# Patient Record
Sex: FEMALE | Race: BLACK OR AFRICAN AMERICAN | NOT HISPANIC OR LATINO | Employment: OTHER | URBAN - METROPOLITAN AREA
[De-identification: names, ages, dates, MRNs, and addresses within clinical notes are randomized per-mention and may not be internally consistent; named-entity substitution may affect disease eponyms.]

---

## 2020-02-10 ENCOUNTER — LAB VISIT (OUTPATIENT)
Dept: LAB | Facility: HOSPITAL | Age: 70
End: 2020-02-10
Payer: COMMERCIAL

## 2020-02-10 ENCOUNTER — OFFICE VISIT (OUTPATIENT)
Dept: FAMILY MEDICINE | Facility: CLINIC | Age: 70
End: 2020-02-10
Payer: COMMERCIAL

## 2020-02-10 VITALS
OXYGEN SATURATION: 97 % | TEMPERATURE: 98 F | WEIGHT: 223.13 LBS | BODY MASS INDEX: 38.09 KG/M2 | SYSTOLIC BLOOD PRESSURE: 130 MMHG | DIASTOLIC BLOOD PRESSURE: 80 MMHG | HEIGHT: 64 IN | HEART RATE: 100 BPM

## 2020-02-10 DIAGNOSIS — Z00.00 ENCOUNTER FOR WELLNESS EXAMINATION: Primary | ICD-10-CM

## 2020-02-10 DIAGNOSIS — N75.0 BARTHOLIN GLAND CYST: ICD-10-CM

## 2020-02-10 DIAGNOSIS — B00.9 HSV-2 (HERPES SIMPLEX VIRUS 2) INFECTION: ICD-10-CM

## 2020-02-10 DIAGNOSIS — Z12.11 SCREENING FOR COLON CANCER: ICD-10-CM

## 2020-02-10 DIAGNOSIS — Z23 NEED FOR VACCINATION AGAINST STREPTOCOCCUS PNEUMONIAE: ICD-10-CM

## 2020-02-10 DIAGNOSIS — Z00.00 ENCOUNTER FOR WELLNESS EXAMINATION: ICD-10-CM

## 2020-02-10 DIAGNOSIS — Z11.59 NEED FOR HEPATITIS C SCREENING TEST: ICD-10-CM

## 2020-02-10 DIAGNOSIS — F17.210 NICOTINE DEPENDENCE, CIGARETTES, UNCOMPLICATED: ICD-10-CM

## 2020-02-10 DIAGNOSIS — Z13.820 SCREENING FOR OSTEOPOROSIS: ICD-10-CM

## 2020-02-10 DIAGNOSIS — Z12.39 SCREENING FOR BREAST CANCER: ICD-10-CM

## 2020-02-10 DIAGNOSIS — Z78.0 POST-MENOPAUSAL: ICD-10-CM

## 2020-02-10 PROCEDURE — 90471 IMMUNIZATION ADMIN: CPT | Mod: S$GLB,,, | Performed by: FAMILY MEDICINE

## 2020-02-10 PROCEDURE — 99387 PR PREVENTIVE VISIT,NEW,65 & OVER: ICD-10-PCS | Mod: 25,S$GLB,, | Performed by: FAMILY MEDICINE

## 2020-02-10 PROCEDURE — 90471 PNEUMOCOCCAL CONJUGATE VACCINE 13-VALENT LESS THAN 5YO & GREATER THAN: ICD-10-PCS | Mod: S$GLB,,, | Performed by: FAMILY MEDICINE

## 2020-02-10 PROCEDURE — 99999 PR PBB SHADOW E&M-NEW PATIENT-LVL IV: CPT | Mod: PBBFAC,,, | Performed by: FAMILY MEDICINE

## 2020-02-10 PROCEDURE — 81001 URINALYSIS AUTO W/SCOPE: CPT

## 2020-02-10 PROCEDURE — 99387 INIT PM E/M NEW PAT 65+ YRS: CPT | Mod: 25,S$GLB,, | Performed by: FAMILY MEDICINE

## 2020-02-10 PROCEDURE — 90670 PCV13 VACCINE IM: CPT | Mod: S$GLB,,, | Performed by: FAMILY MEDICINE

## 2020-02-10 PROCEDURE — 90670 PNEUMOCOCCAL CONJUGATE VACCINE 13-VALENT LESS THAN 5YO & GREATER THAN: ICD-10-PCS | Mod: S$GLB,,, | Performed by: FAMILY MEDICINE

## 2020-02-10 PROCEDURE — 99999 PR PBB SHADOW E&M-NEW PATIENT-LVL IV: ICD-10-PCS | Mod: PBBFAC,,, | Performed by: FAMILY MEDICINE

## 2020-02-10 RX ORDER — VALACYCLOVIR HYDROCHLORIDE 1 G/1
1000 TABLET, FILM COATED ORAL DAILY
Qty: 5 TABLET | Refills: 2 | Status: SHIPPED | OUTPATIENT
Start: 2020-02-10 | End: 2020-04-22 | Stop reason: SDUPTHER

## 2020-02-10 NOTE — PROGRESS NOTES
Subjective:      Patient ID: Triny Mandel is a 69 y.o. female.    Chief Complaint: Establish Care    HPI    Patient here today to establish care   Only complaint today is some blood from her rectum - not often   Notes that she does feel constipated at times, burning at times, only sees blood when she wipes  No changes in stool, no blood in stool  Last bowel movement - this morning. No blood at the time. Last noticed blood yesterday.   Notes that last time this happened she was diagnosed with HSV 2 and given acyclovir and it helped   Has been tested for HIV and other STDs recently in 10/19 and was told that everything else was negative     Needs mammogram   Needs colonoscopy   Needs DXA scan   Would like lab work today   Current smoker     Past Medical History:   Diagnosis Date    Arthritis     Herpes simplex        Past Surgical History:   Procedure Laterality Date    HYSTERECTOMY      unclear - doesn't think it was due to cancer       Family History   Problem Relation Age of Onset    Cancer Mother         breast cancer    No Known Problems Sister     No Known Problems Brother     No Known Problems Daughter     No Known Problems Son     No Known Problems Brother     No Known Problems Son        Social History     Socioeconomic History    Marital status:      Spouse name: Not on file    Number of children: 4    Years of education: Not on file    Highest education level: Not on file   Occupational History    Occupation: work at [employer]    Social Needs    Financial resource strain: Not on file    Food insecurity:     Worry: Not on file     Inability: Not on file    Transportation needs:     Medical: Not on file     Non-medical: Not on file   Tobacco Use    Smoking status: Current Every Day Smoker     Packs/day: 1.00     Years: 45.00     Pack years: 45.00    Smokeless tobacco: Never Used   Substance and Sexual Activity    Alcohol use: Yes     Frequency: Never     Comment:  every hoilday     Drug use: Never    Sexual activity: Not Currently     Partners: Male   Lifestyle    Physical activity:     Days per week: Not on file     Minutes per session: Not on file    Stress: Not on file   Relationships    Social connections:     Talks on phone: Not on file     Gets together: Not on file     Attends [employer] service: Not on file     Active member of club or organization: Not on file     Attends meetings of clubs or organizations: Not on file     Relationship status: Not on file   Other Topics Concern    Not on file   Social History Narrative    Not on file       The patient has no Health Maintenance topics of status Not Due        Review of patient's allergies indicates:  No Known Allergies    Review of Systems   Constitutional: Negative for chills and fever.   HENT: Negative for ear pain.    Eyes: Negative for blurred vision and pain.   Respiratory: Negative for cough and shortness of breath.    Cardiovascular: Negative for chest pain and leg swelling.   Gastrointestinal: Positive for blood in stool. Negative for abdominal pain, constipation, diarrhea, nausea and vomiting.   Genitourinary: Negative for dysuria.   Musculoskeletal: Negative for myalgias.   Skin: Positive for rash.   Neurological: Negative for headaches.       Objective:     Vitals:    02/10/20 0944   BP: 130/80   Pulse: 100   Temp: 98.2 °F (36.8 °C)     Body mass index is 38.3 kg/m².    Physical Exam   Constitutional: She is oriented to person, place, and time. She appears well-developed and well-nourished. No distress.   HENT:   Head: Normocephalic and atraumatic.   Right Ear: External ear normal.   Left Ear: External ear normal.   Nose: Nose normal.   Mouth/Throat: Oropharynx is clear and moist.   Eyes: Pupils are equal, round, and reactive to light. Conjunctivae and EOM are normal.   Neck: No thyromegaly present.   Cardiovascular: Normal rate, regular rhythm, normal heart sounds and intact distal pulses.   No  murmur heard.  Pulmonary/Chest: Effort normal and breath sounds normal. No respiratory distress. She has no wheezes. She has no rales. She exhibits no tenderness. Right breast exhibits no inverted nipple, no mass, no nipple discharge, no skin change and no tenderness. Left breast exhibits no inverted nipple, no mass, no nipple discharge, no skin change and no tenderness.   Abdominal: Soft. Bowel sounds are normal. She exhibits no distension. There is no tenderness.   Genitourinary: Rectal exam shows guaiac negative stool.       No vaginal discharge found.   Genitourinary Comments: Rash around rectum, small ulcers/blister like lesions that are tender  bartholian gland cyst. Not tender. No drainage.    Musculoskeletal: She exhibits no edema.   Lymphadenopathy:     She has no cervical adenopathy.   Neurological: She is alert and oriented to person, place, and time.   Skin: Skin is warm and dry.   Psychiatric: She has a normal mood and affect.   Nursing note and vitals reviewed.      Assessment and Plan:     Encounter for wellness examination  -     CBC auto differential; Future; Expected date: 02/10/2020  -     Comprehensive metabolic panel; Future; Expected date: 02/10/2020  -     Hemoglobin A1c; Future; Expected date: 02/10/2020  -     Lipid panel; Future; Expected date: 02/10/2020  -     Hepatitis C antibody; Future; Expected date: 02/10/2020  -     Mammo Digital Screening Bilat; Future; Expected date: 02/10/2020  -     Case request GI: COLONOSCOPY  -     DXA Bone Density Spine And Hip; Future; Expected date: 02/10/2020  -     Urinalysis; Future; Expected date: 02/10/2020    Need for hepatitis C screening test  -     Hepatitis C antibody; Future; Expected date: 02/10/2020    Screening for colon cancer  -     Case request GI: COLONOSCOPY    Screening for breast cancer  -     Mammo Digital Screening Bilat; Future; Expected date: 02/10/2020    HSV-2 (herpes simplex virus 2) infection  Educated patient on herpes, safe  sex practices  Will prescribe as needed therapy for now - advised if continues to have flares - would consider daily medication    Screening for osteoporosis  -     DXA Bone Density Spine And Hip; Future; Expected date: 02/10/2020    Post-menopausal  -     DXA Bone Density Spine And Hip; Future; Expected date: 02/10/2020    Need for vaccination against Streptococcus pneumoniae  -     (In Office Administered) Pneumococcal Conjugate Vaccine (13 Valent) (IM)    Bartholin gland cyst  Asymptomatic - would like to just monitor for now     Tobacco use  Advised cessation - patient has tried patches in the past - would like to start the pills. Does not want to attend the classes.   Will review lab work prior to starting chantix     Other orders  -     valACYclovir (VALTREX) 1000 MG tablet; Take 1 tablet (1,000 mg total) by mouth once daily. for 5 days  Dispense: 5 tablet; Refill: 2        Follow up in about 6 months (around 8/10/2020).    @@

## 2020-02-11 ENCOUNTER — TELEPHONE (OUTPATIENT)
Dept: ENDOSCOPY | Facility: HOSPITAL | Age: 70
End: 2020-02-11

## 2020-02-11 LAB
BACTERIA #/AREA URNS AUTO: ABNORMAL /HPF
BILIRUB UR QL STRIP: NEGATIVE
CLARITY UR REFRACT.AUTO: ABNORMAL
COLOR UR AUTO: YELLOW
GLUCOSE UR QL STRIP: NEGATIVE
HGB UR QL STRIP: ABNORMAL
KETONES UR QL STRIP: NEGATIVE
LEUKOCYTE ESTERASE UR QL STRIP: NEGATIVE
MICROSCOPIC COMMENT: ABNORMAL
NITRITE UR QL STRIP: POSITIVE
PH UR STRIP: 5 [PH] (ref 5–8)
PROT UR QL STRIP: NEGATIVE
RBC #/AREA URNS AUTO: 4 /HPF (ref 0–4)
SP GR UR STRIP: 1.02 (ref 1–1.03)
SQUAMOUS #/AREA URNS AUTO: 21 /HPF
URN SPEC COLLECT METH UR: ABNORMAL
WBC #/AREA URNS AUTO: 2 /HPF (ref 0–5)

## 2020-02-13 RX ORDER — NITROFURANTOIN 25; 75 MG/1; MG/1
100 CAPSULE ORAL 2 TIMES DAILY
Qty: 14 CAPSULE | Refills: 0 | Status: SHIPPED | OUTPATIENT
Start: 2020-02-13 | End: 2020-02-20

## 2020-04-22 DIAGNOSIS — B00.9 HSV-2 (HERPES SIMPLEX VIRUS 2) INFECTION: Primary | ICD-10-CM

## 2020-04-22 NOTE — TELEPHONE ENCOUNTER
----- Message from Maida Benson sent at 4/22/2020 11:13 AM CDT -----  Contact: [phone]  Would like to consult with the nurse, patient would like to get a Rx medication refill, please call back at  [phone], thanks sj  .Type:  RX Refill Request    Who Called: Ms Mandel  Refill or New Rx:refill  RX Name and Strength:patient not sure the name  How is the patient currently taking it? (ex. 1XDay):once a day  Is this a 30 day or 90 day RX:  Preferred Pharmacy with phone number:.  [id number] [place], [place] - [address] [facility] [address]  [address] [address]  Phone: [phone] Fax: [phone]      Local or Mail Order: local  Ordering Provider:dr Peguero  Would the patient rather a call back or a response via [facility]? callback  Best Call Back Number:[phone]  Additional Information:

## 2020-04-23 RX ORDER — VALACYCLOVIR HYDROCHLORIDE 1 G/1
1000 TABLET, FILM COATED ORAL DAILY
Qty: 5 TABLET | Refills: 2 | Status: SHIPPED | OUTPATIENT
Start: 2020-04-23 | End: 2020-07-09 | Stop reason: SDUPTHER

## 2020-07-09 ENCOUNTER — HOSPITAL ENCOUNTER (OUTPATIENT)
Dept: RADIOLOGY | Facility: HOSPITAL | Age: 70
Discharge: HOME | End: 2020-07-09
Attending: REGISTERED NURSE
Payer: COMMERCIAL

## 2020-07-09 ENCOUNTER — OFFICE VISIT (OUTPATIENT)
Dept: FAMILY MEDICINE | Facility: CLINIC | Age: 70
End: 2020-07-09
Payer: COMMERCIAL

## 2020-07-09 VITALS
OXYGEN SATURATION: 96 % | SYSTOLIC BLOOD PRESSURE: 128 MMHG | DIASTOLIC BLOOD PRESSURE: 78 MMHG | TEMPERATURE: 98 F | BODY MASS INDEX: 41.79 KG/M2 | HEIGHT: 63 IN | WEIGHT: 235.88 LBS | HEART RATE: 95 BPM

## 2020-07-09 DIAGNOSIS — R60.0 LOWER EXTREMITY EDEMA: Primary | ICD-10-CM

## 2020-07-09 DIAGNOSIS — B00.9 HSV-2 (HERPES SIMPLEX VIRUS 2) INFECTION: ICD-10-CM

## 2020-07-09 DIAGNOSIS — R06.02 SHORTNESS OF BREATH: ICD-10-CM

## 2020-07-09 DIAGNOSIS — Z12.11 COLON CANCER SCREENING: ICD-10-CM

## 2020-07-09 DIAGNOSIS — Z00.00 HEALTHCARE MAINTENANCE: ICD-10-CM

## 2020-07-09 DIAGNOSIS — Z12.39 BREAST CANCER SCREENING: ICD-10-CM

## 2020-07-09 PROCEDURE — 1101F PT FALLS ASSESS-DOCD LE1/YR: CPT | Mod: CPTII,S$GLB,, | Performed by: REGISTERED NURSE

## 2020-07-09 PROCEDURE — 3008F PR BODY MASS INDEX (BMI) DOCUMENTED: ICD-10-PCS | Mod: CPTII,S$GLB,, | Performed by: REGISTERED NURSE

## 2020-07-09 PROCEDURE — 99214 PR OFFICE/OUTPT VISIT, EST, LEVL IV, 30-39 MIN: ICD-10-PCS | Mod: S$GLB,,, | Performed by: REGISTERED NURSE

## 2020-07-09 PROCEDURE — 99214 OFFICE O/P EST MOD 30 MIN: CPT | Mod: S$GLB,,, | Performed by: REGISTERED NURSE

## 2020-07-09 PROCEDURE — 1125F AMNT PAIN NOTED PAIN PRSNT: CPT | Mod: S$GLB,,, | Performed by: REGISTERED NURSE

## 2020-07-09 PROCEDURE — 71046 X-RAY EXAM CHEST 2 VIEWS: CPT | Mod: TC,FY,PO

## 2020-07-09 PROCEDURE — 1125F PR PAIN SEVERITY QUANTIFIED, PAIN PRESENT: ICD-10-PCS | Mod: S$GLB,,, | Performed by: REGISTERED NURSE

## 2020-07-09 PROCEDURE — 99999 PR PBB SHADOW E&M-EST. PATIENT-LVL III: ICD-10-PCS | Mod: PBBFAC,,, | Performed by: REGISTERED NURSE

## 2020-07-09 PROCEDURE — 71046 X-RAY EXAM CHEST 2 VIEWS: CPT | Mod: 26,,, | Performed by: RADIOLOGY

## 2020-07-09 PROCEDURE — 1159F PR MEDICATION LIST DOCUMENTED IN MEDICAL RECORD: ICD-10-PCS | Mod: S$GLB,,, | Performed by: REGISTERED NURSE

## 2020-07-09 PROCEDURE — 1101F PR PT FALLS ASSESS DOC 0-1 FALLS W/OUT INJ PAST YR: ICD-10-PCS | Mod: CPTII,S$GLB,, | Performed by: REGISTERED NURSE

## 2020-07-09 PROCEDURE — 71046 XR CHEST PA AND LATERAL: ICD-10-PCS | Mod: 26,,, | Performed by: RADIOLOGY

## 2020-07-09 PROCEDURE — 99999 PR PBB SHADOW E&M-EST. PATIENT-LVL III: CPT | Mod: PBBFAC,,, | Performed by: REGISTERED NURSE

## 2020-07-09 PROCEDURE — 3008F BODY MASS INDEX DOCD: CPT | Mod: CPTII,S$GLB,, | Performed by: REGISTERED NURSE

## 2020-07-09 PROCEDURE — 1159F MED LIST DOCD IN RCRD: CPT | Mod: S$GLB,,, | Performed by: REGISTERED NURSE

## 2020-07-09 RX ORDER — HYDROCHLOROTHIAZIDE 12.5 MG/1
12.5 CAPSULE ORAL DAILY PRN
Qty: 30 CAPSULE | Refills: 1 | Status: SHIPPED | OUTPATIENT
Start: 2020-07-09 | End: 2020-07-09

## 2020-07-09 RX ORDER — VALACYCLOVIR HYDROCHLORIDE 1 G/1
1000 TABLET, FILM COATED ORAL DAILY
Qty: 5 TABLET | Refills: 2 | Status: SHIPPED | OUTPATIENT
Start: 2020-07-09 | End: 2020-08-27 | Stop reason: SDUPTHER

## 2020-07-09 NOTE — PROGRESS NOTES
"Subjective:       Patient ID: Triny Mandel is a 69 y.o. female.    Chief Complaint   Patient presents with    Foot Swelling     both feet       HPI    Patient reports swelling to both feet and ankles for the past 3 weeks, worsening.  Admits to increased intake of salt & fast food.  Has just recently started increasing daily water intake.  No chest pain, reports occ mild sob.        Review of Systems   Constitutional: Negative.    Respiratory: Positive for shortness of breath. Negative for cough, choking, chest tightness and stridor.    Cardiovascular: Positive for leg swelling. Negative for chest pain and palpitations.   Neurological: Negative.          Review of patient's allergies indicates:  No Known Allergies      Patient Active Problem List   Diagnosis    HSV-2 (herpes simplex virus 2) infection         Current Outpatient Medications:     hydroCHLOROthiazide (MICROZIDE) 12.5 mg capsule, TAKE 1 CAPSULE(12.5 MG) BY MOUTH DAILY AS NEEDED FOR LEG SWELLING, Disp: 90 capsule, Rfl: 0    valACYclovir (VALTREX) 1000 MG tablet, Take 1 tablet (1,000 mg total) by mouth once daily. for 5 days, Disp: 5 tablet, Rfl: 2        Past medical, surgical, family and social histories have been reviewed today.      Objective:     Vitals:    07/09/20 1509   BP: 128/78   Pulse: 95   Temp: 98.4 °F (36.9 °C)   TempSrc: Oral   SpO2: 96%   Weight: 107 kg (235 lb 14.3 oz)   Height: 5' 3" (1.6 m)   PainSc:   8   PainLoc: Foot         Physical Exam  Vitals signs reviewed.   Constitutional:       General: She is not in acute distress.  HENT:      Head: Normocephalic and atraumatic.   Eyes:      Pupils: Pupils are equal, round, and reactive to light.   Neck:      Musculoskeletal: Normal range of motion. No neck rigidity.      Vascular: No carotid bruit.   Cardiovascular:      Rate and Rhythm: Normal rate and regular rhythm.      Pulses: Normal pulses.      Heart sounds: Normal heart sounds.   Pulmonary:      Effort: Pulmonary effort is " normal. No respiratory distress.      Breath sounds: Normal breath sounds.   Musculoskeletal: Normal range of motion.         General: Swelling (trace to BLE) present. No tenderness or deformity.   Skin:     Capillary Refill: Capillary refill takes less than 2 seconds.   Neurological:      Mental Status: She is alert and oriented to person, place, and time.           Diagnosis       1. Lower extremity edema    2. Shortness of breath    3. HSV-2 (herpes simplex virus 2) infection    4. Healthcare maintenance    5. Breast cancer screening    6. Colon cancer screening          Assessment/ Plan     Lower extremity edema --- new issue, likely due to increased salt and no water.  She has just recently started drinking some water daily.  Elevate legs when possible.  Low-salt diet.  Check lab today.  Diuretic prn.  -     CBC auto differential; Future; Expected date: 07/09/2020  -     Comprehensive metabolic panel; Future; Expected date: 07/09/2020  -     Brain Natriuretic Peptide; Future; Expected date: 07/09/2020  -     hydroCHLOROthiazide (MICROZIDE) 12.5 mg capsule; Take 1 capsule (12.5 mg total) by mouth daily as needed (leg swelling).  Dispense: 30 capsule; Refill: 1    Shortness of breath  -     CBC auto differential; Future; Expected date: 07/09/2020  -     Comprehensive metabolic panel; Future; Expected date: 07/09/2020  -     Brain Natriuretic Peptide; Future; Expected date: 07/09/2020  -     X-Ray Chest PA And Lateral; Future; Expected date: 07/09/2020    HSV-2 (herpes simplex virus 2) infection --- med refilled per request.  -     valACYclovir (VALTREX) 1000 MG tablet; Take 1 tablet (1,000 mg total) by mouth once daily. for 5 days  Dispense: 5 tablet; Refill: 2    Healthcare maintenance  -     Mammo Digital Screening Bilateral With CAD; Future; Expected date: 07/09/2020  -     Case request GI: COLONOSCOPY    Breast cancer screening  -     Mammo Digital Screening Bilateral With CAD; Future; Expected date:  07/09/2020    Colon cancer screening  -     Case request GI: COLONOSCOPY        Follow-up:  Return or contact office back in 2 to 3 days if worse or no better.  Otherwise, return prn.        ANJEL Casas  [facility]

## 2020-07-10 ENCOUNTER — TELEPHONE (OUTPATIENT)
Dept: ENDOSCOPY | Facility: HOSPITAL | Age: 70
End: 2020-07-10

## 2020-07-10 DIAGNOSIS — Z13.9 SCREENING PROCEDURE: Primary | ICD-10-CM

## 2020-07-10 RX ORDER — SODIUM, POTASSIUM,MAG SULFATES 17.5-3.13G
1 SOLUTION, RECONSTITUTED, ORAL ORAL DAILY
Qty: 1 KIT | Refills: 0 | Status: SHIPPED | OUTPATIENT
Start: 2020-07-10 | End: 2020-07-12

## 2020-07-13 ENCOUNTER — TELEPHONE (OUTPATIENT)
Dept: FAMILY MEDICINE | Facility: CLINIC | Age: 70
End: 2020-07-13

## 2020-07-13 NOTE — TELEPHONE ENCOUNTER
----- Message from Jhoana Reed sent at 7/13/2020  3:39 PM CDT -----  Regarding: ret call  Contact: patient  Type:  Patient Returning Call    Who Called:patient  Who Left Message for Patient:nurse  Does the patient know what this is regarding?:ret call  Would the patient rather a call back or a response via [facility]? call  Best Call Back Number:[phone]  Additional Information: please call back

## 2020-07-13 NOTE — TELEPHONE ENCOUNTER
S/w pt re labs and xray advised pt Labs okay, just follow instructions and directions given at her appointment and chest xr was clear. Pt verbalized understanding.

## 2020-07-27 ENCOUNTER — LAB VISIT (OUTPATIENT)
Dept: OTOLARYNGOLOGY | Facility: CLINIC | Age: 70
End: 2020-07-27
Payer: MEDICARE

## 2020-07-27 DIAGNOSIS — Z13.9 SCREENING PROCEDURE: ICD-10-CM

## 2020-07-27 PROCEDURE — U0003 INFECTIOUS AGENT DETECTION BY NUCLEIC ACID (DNA OR RNA); SEVERE ACUTE RESPIRATORY SYNDROME CORONAVIRUS 2 (SARS-COV-2) (CORONAVIRUS DISEASE [COVID-19]), AMPLIFIED PROBE TECHNIQUE, MAKING USE OF HIGH THROUGHPUT TECHNOLOGIES AS DESCRIBED BY CMS-2020-01-R: HCPCS

## 2020-07-28 LAB — SARS-COV-2 RNA RESP QL NAA+PROBE: NOT DETECTED

## 2020-07-29 ENCOUNTER — ANESTHESIA EVENT (OUTPATIENT)
Dept: ENDOSCOPY | Facility: HOSPITAL | Age: 70
End: 2020-07-29
Payer: MEDICARE

## 2020-07-29 NOTE — PRE-PROCEDURE INSTRUCTIONS
PAT call completed and patient educated on the bowel prep, clear liquid diet and procedure instructions. Medical history discussed and patient informed of arrival times 0800 and 2nd prep dose 0430. Pt will be accompanied by  and is made aware of limited-visitor policy, and is made aware that  is to remain during entire visit. All questions and concerns addressed. Informed to take AM medications of HCTZ. NPO after 2nd bowel prep. Patient verbalizes understanding of teaching and all instructions. Pre-procedure Covid testing complete.

## 2020-07-29 NOTE — ANESTHESIA PREPROCEDURE EVALUATION
07/29/2020  Triny Mandel is a 69 y.o., female.    Anesthesia Evaluation    I have reviewed the Patient Summary Reports.    I have reviewed the Nursing Notes. I have reviewed the NPO Status.   I have reviewed the Medications.     Review of Systems  Anesthesia Hx:  No problems with previous Anesthesia  Denies Family Hx of Anesthesia complications.   Denies Personal Hx of Anesthesia complications.   Social:  No Alcohol Use, Smoker    Hematology/Oncology:  Hematology Normal        Cardiovascular:  Cardiovascular Normal  ECG has been reviewed. Bilat lower extremity edema.   Pulmonary:  Pulmonary Normal    Renal/:  Renal/ Normal     Hepatic/GI:  Hepatic/GI Normal    Musculoskeletal:   Arthritis     Neurological:  Neurology Normal    Psych:  Psychiatric Normal           Physical Exam  General:  Morbid Obesity    Airway/Jaw/Neck:  Airway Findings: Mouth Opening: Normal Tongue: Normal  General Airway Assessment: Adult  Mallampati: II  TM Distance: Normal, at least 6 cm  Jaw/Neck Findings:  Neck ROM: Normal ROM  Neck Findings:     Eyes/Ears/Nose:  Eyes/Ears/Nose Findings:    Dental:  Dental Findings: In tact, Upper partial dentures   Chest/Lungs:  Chest/Lungs Findings: Clear to auscultation, Normal Respiratory Rate     Heart/Vascular:  Heart Findings: Rate: Normal  Rhythm: Regular Rhythm  Sounds: Normal  Heart murmur: negative Vascular Findings: Normal    Abdomen:  Abdomen Findings: Normal    Musculoskeletal:  Musculoskeletal Findings: Normal   Skin:  Skin Findings: Normal    Mental Status:  Mental Status Findings:  Alert and Oriented         Anesthesia Plan  Type of Anesthesia, risks & benefits discussed:  Anesthesia Type:  general  Patient's Preference:   Intra-op Monitoring Plan: standard ASA monitors  Intra-op Monitoring Plan Comments:   Post Op Pain Control Plan: per primary service following discharge from  PACU  Post Op Pain Control Plan Comments:   Induction:   IV  Beta Blocker:  Patient is not currently on a Beta-Blocker (No further documentation required).       Informed Consent: Patient understands risks and agrees with Anesthesia plan.  Questions answered. Anesthesia consent signed with patient.  ASA Score: 2     Day of Surgery Review of History & Physical:    H&P update referred to the surgeon.         Ready For Surgery From Anesthesia Perspective.

## 2020-07-30 ENCOUNTER — ANESTHESIA (OUTPATIENT)
Dept: ENDOSCOPY | Facility: HOSPITAL | Age: 70
End: 2020-07-30
Payer: MEDICARE

## 2020-07-30 ENCOUNTER — HOSPITAL ENCOUNTER (OUTPATIENT)
Facility: HOSPITAL | Age: 70
Discharge: HOME | End: 2020-07-30
Attending: INTERNAL MEDICINE | Admitting: INTERNAL MEDICINE
Payer: MEDICARE

## 2020-07-30 VITALS
RESPIRATION RATE: 20 BRPM | BODY MASS INDEX: 40.94 KG/M2 | SYSTOLIC BLOOD PRESSURE: 154 MMHG | HEIGHT: 63 IN | HEART RATE: 83 BPM | WEIGHT: 231.06 LBS | DIASTOLIC BLOOD PRESSURE: 73 MMHG | OXYGEN SATURATION: 99 % | TEMPERATURE: 98 F

## 2020-07-30 DIAGNOSIS — Z12.11 COLON CANCER SCREENING: Primary | ICD-10-CM

## 2020-07-30 PROCEDURE — 37000008 HC ANESTHESIA 1ST 15 MINUTES: Performed by: INTERNAL MEDICINE

## 2020-07-30 PROCEDURE — D9220A PRA ANESTHESIA: Mod: 33,ANES,, | Performed by: ANESTHESIOLOGY

## 2020-07-30 PROCEDURE — 25000003 PHARM REV CODE 250: Performed by: NURSE ANESTHETIST, CERTIFIED REGISTERED

## 2020-07-30 PROCEDURE — 88305 TISSUE EXAM BY PATHOLOGIST: ICD-10-PCS | Mod: 26,,, | Performed by: PATHOLOGY

## 2020-07-30 PROCEDURE — 45385 PR COLONOSCOPY,REMV LESN,SNARE: ICD-10-PCS | Mod: 33,,, | Performed by: INTERNAL MEDICINE

## 2020-07-30 PROCEDURE — 25000003 PHARM REV CODE 250: Mod: HCNC | Performed by: NURSE ANESTHETIST, CERTIFIED REGISTERED

## 2020-07-30 PROCEDURE — 63600175 PHARM REV CODE 636 W HCPCS: Performed by: NURSE ANESTHETIST, CERTIFIED REGISTERED

## 2020-07-30 PROCEDURE — 88305 TISSUE EXAM BY PATHOLOGIST: CPT | Mod: 26,,, | Performed by: PATHOLOGY

## 2020-07-30 PROCEDURE — D9220A PRA ANESTHESIA: ICD-10-PCS | Mod: 33,CRNA,, | Performed by: NURSE ANESTHETIST, CERTIFIED REGISTERED

## 2020-07-30 PROCEDURE — 45385 COLONOSCOPY W/LESION REMOVAL: CPT | Mod: 33,,, | Performed by: INTERNAL MEDICINE

## 2020-07-30 PROCEDURE — 37000009 HC ANESTHESIA EA ADD 15 MINS: Performed by: INTERNAL MEDICINE

## 2020-07-30 PROCEDURE — 27201089 HC SNARE, DISP (ANY): Performed by: INTERNAL MEDICINE

## 2020-07-30 PROCEDURE — 88305 TISSUE EXAM BY PATHOLOGIST: CPT | Performed by: PATHOLOGY

## 2020-07-30 PROCEDURE — D9220A PRA ANESTHESIA: ICD-10-PCS | Mod: 33,ANES,, | Performed by: ANESTHESIOLOGY

## 2020-07-30 PROCEDURE — D9220A PRA ANESTHESIA: Mod: 33,CRNA,, | Performed by: NURSE ANESTHETIST, CERTIFIED REGISTERED

## 2020-07-30 PROCEDURE — 45385 COLONOSCOPY W/LESION REMOVAL: CPT | Performed by: INTERNAL MEDICINE

## 2020-07-30 RX ORDER — SODIUM CHLORIDE, SODIUM LACTATE, POTASSIUM CHLORIDE, CALCIUM CHLORIDE 600; 310; 30; 20 MG/100ML; MG/100ML; MG/100ML; MG/100ML
INJECTION, SOLUTION INTRAVENOUS CONTINUOUS PRN
Status: DISCONTINUED | OUTPATIENT
Start: 2020-07-30 | End: 2020-07-30

## 2020-07-30 RX ORDER — GLYCOPYRROLATE 0.2 MG/ML
INJECTION INTRAMUSCULAR; INTRAVENOUS
Status: DISCONTINUED | OUTPATIENT
Start: 2020-07-30 | End: 2020-07-30

## 2020-07-30 RX ORDER — ONDANSETRON 2 MG/ML
4 INJECTION INTRAMUSCULAR; INTRAVENOUS DAILY PRN
Status: DISCONTINUED | OUTPATIENT
Start: 2020-07-30 | End: 2020-07-30 | Stop reason: HOSPADM

## 2020-07-30 RX ORDER — LIDOCAINE HYDROCHLORIDE 20 MG/ML
INJECTION, SOLUTION EPIDURAL; INFILTRATION; INTRACAUDAL; PERINEURAL
Status: DISCONTINUED | OUTPATIENT
Start: 2020-07-30 | End: 2020-07-30

## 2020-07-30 RX ORDER — PROPOFOL 10 MG/ML
INJECTION, EMULSION INTRAVENOUS
Status: DISCONTINUED | OUTPATIENT
Start: 2020-07-30 | End: 2020-07-30

## 2020-07-30 RX ORDER — SODIUM CHLORIDE 0.9 % (FLUSH) 0.9 %
10 SYRINGE (ML) INJECTION
Status: DISCONTINUED | OUTPATIENT
Start: 2020-07-30 | End: 2020-07-30 | Stop reason: HOSPADM

## 2020-07-30 RX ORDER — SODIUM CHLORIDE, SODIUM LACTATE, POTASSIUM CHLORIDE, CALCIUM CHLORIDE 600; 310; 30; 20 MG/100ML; MG/100ML; MG/100ML; MG/100ML
INJECTION, SOLUTION INTRAVENOUS CONTINUOUS
Status: DISCONTINUED | OUTPATIENT
Start: 2020-07-30 | End: 2020-07-30 | Stop reason: HOSPADM

## 2020-07-30 RX ADMIN — PROPOFOL 20 MG: 10 INJECTION, EMULSION INTRAVENOUS at 09:07

## 2020-07-30 RX ADMIN — SODIUM CHLORIDE, SODIUM LACTATE, POTASSIUM CHLORIDE, AND CALCIUM CHLORIDE: .6; .31; .03; .02 INJECTION, SOLUTION INTRAVENOUS at 09:07

## 2020-07-30 RX ADMIN — LIDOCAINE HYDROCHLORIDE 50 MG: 20 INJECTION, SOLUTION EPIDURAL; INFILTRATION; INTRACAUDAL; PERINEURAL at 09:07

## 2020-07-30 RX ADMIN — PROPOFOL 50 MG: 10 INJECTION, EMULSION INTRAVENOUS at 09:07

## 2020-07-30 RX ADMIN — GLYCOPYRROLATE 0.2 MG: 0.2 INJECTION INTRAMUSCULAR; INTRAVENOUS at 09:07

## 2020-07-30 NOTE — DISCHARGE INSTRUCTIONS
Diverticulosis    Diverticulosis means that small pouches have formed in the wall of your large intestine (colon). Most often, this problem causes no symptoms and is common as people age. But the pouches in the colon are at risk of becoming infected. When this happens, the condition is called diverticulitis. Although most people with diverticulosis never develop diverticulitis, it is still not uncommon. Rectal bleeding can also occur and in less common situations, a type of colon inflammation called colitis.  While most people do not have symptoms, some people with diverticulosis may have:  · Abdominal cramps and pain  · Bloating  · Constipation  · Change in bowel habits  Causes  The exact cause of diverticulosis (and diverticulitis) has not been proved, but a few things are associated with the condition:  · Low-fiber diet  · Constipation  · Lack of exercise  Your healthcare provider will talk with you about how to manage your condition. Diet changes may be all that are needed to help control diverticulosis and prevent progression to diverticulitis. If you develop diverticulitis, you will likely need other treatments.  Home care  You may be told to take fiber supplements daily. Fiber adds bulk to the stool so that it passes through the colon more easily. Stool softeners may be recommended. You may also be given medications for pain relief. Be sure to take all medications as directed.  In the past, people were told to avoid corn, nuts, and seeds. This is no longer necessary.  Follow these guidelines when caring for yourself at home:  · Eat unprocessed foods that are high in fiber. Whole grains, fruits, and vegetables are good choices.  · Drink 6 to 8 glasses of water every day unless your healthcare provider has you limit how much fluid you should have.  · Watch for changes in your bowel movements. Tell your provider if you notice any changes.  · Begin an exercise program. Ask your provider how to get started.  Generally, walking is the best.  · Get plenty of rest and sleep.  Follow-up care  Follow up with your healthcare provider, or as advised. Regular visits may be needed to check on your health. Sometimes special procedures such as colonoscopy, are needed after an episode of diverticulitis or blooding. Be sure to keep all your appointments.  If a stool sample was taken, or cultures were done, you should be told if they are positive, or if your treatment needs to be changed. You can call as directed for the results.  If X-rays were done, a radiologist will look at them. You will be told if there is a change in your treatment.  If antibiotics were prescribed, be sure to finish them all.  When to seek medical advice  Call your healthcare provider right away if any of these occur:  · Fever of 100.4°F (38°C) or higher, or as directed by your healthcare provider  · Severe cramps in the lower left side of the abdomen or pain that is getting worse  · Tenderness in the lower left side of the abdomen or worsening pain throughout the abdomen  · Diarrhea or constipation that doesn't get better within 24 hours  · Nausea and vomiting  · Bleeding from the rectum  Call 911  Call emergency services if any of the following occur:  · Trouble breathing  · Confusion  · Very drowsy or trouble awakening  · Fainting or loss of consciousness  · Rapid heart rate  · Chest pain  Date Last Reviewed: 12/30/2015 © 2000-2017 [employer] [address] [place] [address]. All rights reserved. This information is not intended as a substitute for professional medical care. Always follow your healthcare professional's instructions.        Lipoma, No Treatment  A lipoma is a local overgrowth of fatty tissue. It appears as a soft raised area, usually less than 2 inches across. It is a benign condition (not cancer).   Home care  General information regarding lipoma includes:  1. No special care is needed for a lipoma.  2. You can  consider removal for cosmetic reasons.  3. Sometimes lipomas are uncomfortable because they put pressure on surrounding tissues. This is also a reason to have a lipoma removed.  Follow-up care  Follow up with your healthcare provider, or as advised if you want to have the lipoma removed at a later time.  When to seek medical advice  Call your healthcare provider right away if any of the following occur:  · Redness, pain, tenderness, or drainage from the lipoma  · Lipoma begins to enlarge or change shape  · Changes in the color of the skin over the lipoma  Date Last Reviewed: 6/1/2016  © 6044-8788 [employer]. [address] [place] [address], [place] [address]. All rights reserved. This information is not intended as a substitute for professional medical care. Always follow your healthcare professional's instructions.        Understanding Colon and Rectal Polyps    The colon (also called the large intestine) is a muscular tube that forms the last part of the digestive tract. It absorbs water and stores food waste. The colon is about 4 to 6 feet long. The rectum is the last 6 inches of the colon. The colon and rectum have a smooth lining composed of millions of cells. Changes in these cells can lead to growths in the colon that can become cancerous and should be removed. Multiple tests are available to screen for colon cancer, but the colonoscopy is the most recommended test. During colonoscopy, these polyps can be removed. How often you need this test depends on many things including your condition, your family history, symptoms, and what the findings were at the previous colonoscopy.   When the colon lining changes  Changes that happen in the cells that line the colon or rectum can lead to growths called polyps. Over a period of years, polyps can turn cancerous. Removing polyps early may prevent cancer from ever forming.  Polyps  Polyps are fleshy clumps of tissue that form on the lining of the colon or rectum.  Small polyps are usually benign (not cancerous). However, over time, cells in a polyp can change and become cancerous. Certain types of polyps known as adenomatous polyps are premalignant. The risk for invasive cancer increases with the size of the polyp and certain cell and gene features. This means that they can become cancerous if they're not removed. Hyperplastic polyps are benign. They can grow quite large and not turn cancerous.   Cancer  Almost all colorectal cancers start when polyp cells begin growing abnormally. As a cancerous tumor grows, it may involve more and more of the colon or rectum. In time, cancer can also grow beyond the colon or rectum and spread to nearby organs or to glands called lymph nodes. The cells can also travel to other parts of the body. This is known as metastasis. The earlier a cancerous tumor is removed, the better the chance of preventing its spread.    Date Last Reviewed: 8/1/2016  © 7896-2475 [employer]. [address] [place] [address], [place] [address]. All rights reserved. This information is not intended as a substitute for professional medical care. Always follow your healthcare professional's instructions.

## 2020-07-30 NOTE — ANESTHESIA POSTPROCEDURE EVALUATION
Anesthesia Post Evaluation    Patient: Triny Mandel    Procedure(s) Performed: Procedure(s) (LRB):  COLONOSCOPY (N/A)    Final Anesthesia Type: general    Patient location during evaluation: PACU  Patient participation: Yes- Able to Participate  Level of consciousness: awake and alert and oriented  Post-procedure vital signs: reviewed and stable  Pain management: adequate  Airway patency: patent    PONV status at discharge: No PONV  Anesthetic complications: no      Cardiovascular status: blood pressure returned to baseline, stable and hemodynamically stable  Respiratory status: unassisted  Hydration status: euvolemic  Follow-up not needed.          Vitals Value Taken Time   /73 07/30/20 1010   Temp 97.9 07/30/20 1103   Pulse 83 07/30/20 1010   Resp 20 07/30/20 1010   SpO2 99 % 07/30/20 1010         Event Time   Out of Recovery 10:14:00         Pain/Lashonda Score: Lashonda Score: 10 (7/30/2020 10:13 AM)

## 2020-07-30 NOTE — H&P
PRE PROCEDURE H&P    Patient Name: Triny Mandel  MRN: [id number]  : 1950  Date of Procedure:  2020  Referring Physician: Vel Duarte NP  Primary Physician: Natalya Peguero MD  Procedure Physician: Molly Emerson MD       Planned Procedure: Colonoscopy  Diagnosis: screening for colon cancer  Chief Complaint: Same as above    HPI: Patient is an 69 y.o. female is here for the above.         Past Medical History:   Past Medical History:   Diagnosis Date    Arthritis     Herpes simplex         Past Surgical History:  Past Surgical History:   Procedure Laterality Date    HYSTERECTOMY      unclear - doesn't think it was due to cancer        Home Medications:  Prior to Admission medications    Medication Sig Start Date End Date Taking? Authorizing Provider   hydroCHLOROthiazide (MICROZIDE) 12.5 mg capsule TAKE 1 CAPSULE(12.5 MG) BY MOUTH DAILY AS NEEDED FOR LEG SWELLING 20  Yes Vel Duarte NP   valACYclovir (VALTREX) 1000 MG tablet Take 1 tablet (1,000 mg total) by mouth once daily. for 5 days 20  Vel Duarte NP        Allergies:  Review of patient's allergies indicates:  No Known Allergies     Social History:   Social History     Socioeconomic History    Marital status:      Spouse name: Not on file    Number of children: 4    Years of education: Not on file    Highest education level: Not on file   Occupational History    Occupation: work at [employer]    Social Needs    Financial resource strain: Not on file    Food insecurity     Worry: Not on file     Inability: Not on file    Transportation needs     Medical: Not on file     Non-medical: Not on file   Tobacco Use    Smoking status: Current Every Day Smoker     Packs/day: 1.00     Years: 45.00     Pack years: 45.00    Smokeless tobacco: Never Used   Substance and Sexual Activity    Alcohol use: Yes     Frequency: Never     Comment: every hoilday     Drug use: Never     "Sexual activity: Not Currently     Partners: Male   Lifestyle    Physical activity     Days per week: Not on file     Minutes per session: Not on file    Stress: Not on file   Relationships    Social connections     Talks on phone: Not on file     Gets together: Not on file     Attends Caodaism service: Not on file     Active member of club or organization: Not on file     Attends meetings of clubs or organizations: Not on file     Relationship status: Not on file   Other Topics Concern    Not on file   Social History Narrative    Not on file       Family History:  Family History   Problem Relation Age of Onset    Cancer Mother         breast cancer    No Known Problems Sister     No Known Problems Brother     No Known Problems Daughter     No Known Problems Son     No Known Problems Brother     No Known Problems Son        ROS: No acute cardiac events, no acute respiratory complaints.     Physical Exam (all patients):    BP (!) 147/78 (BP Location: Right arm, Patient Position: Lying)   Pulse 78   Temp 97.7 °F (36.5 °C) (Temporal)   Resp 18   Ht 5' 3" (1.6 m)   Wt 104.8 kg (231 lb 0.7 oz)   SpO2 100%   Breastfeeding No   BMI 40.93 kg/m²   Lungs: Clear to auscultation bilaterally, respirations unlabored  Heart: Regular rate and rhythm, S1 and S2 normal, no obvious murmurs  Abdomen:         Soft, non-tender, bowel sounds normal, no masses, no organomegaly    Lab Results   Component Value Date    WBC 7.86 07/09/2020    MCV 92 07/09/2020    RDW 15.5 (H) 07/09/2020     07/09/2020    GLU 82 07/09/2020    HGBA1C 6.2 (H) 02/10/2020    BUN 11 07/09/2020     07/09/2020    K 3.8 07/09/2020     07/09/2020        SEDATION PLAN: per anesthesia      History reviewed, vital signs satisfactory, cardiopulmonary status satisfactory, sedation options, risks and plans have been discussed with the patient  All their questions were answered and the patient agrees to the sedation procedures as " planned and the patient is deemed an appropriate candidate for the sedation as planned.    Procedure explained to patient, informed consent obtained and placed in chart.    Molly Emerson  7/30/2020  8:25 AM

## 2020-07-30 NOTE — TRANSFER OF CARE
"Anesthesia Transfer of Care Note    Patient: Triny Mandel    Procedure(s) Performed: Procedure(s) (LRB):  COLONOSCOPY (N/A)    Patient location: PACU    Anesthesia Type: MAC    Transport from OR: Transported from OR on room air with adequate spontaneous ventilation    Post pain: adequate analgesia    Post assessment: no apparent anesthetic complications and tolerated procedure well    Post vital signs: stable    Level of consciousness: awake    Nausea/Vomiting: no nausea/vomiting    Complications: none    Transfer of care protocol was followed      Last vitals:   Visit Vitals  /78 (BP Location: Left arm, Patient Position: Lying)   Pulse 94   Temp 36.5 °C (97.7 °F) (Temporal)   Resp 20   Ht 5' 3" (1.6 m)   Wt 104.8 kg (231 lb 0.7 oz)   SpO2 96%   Breastfeeding No   BMI 40.93 kg/m²     "

## 2020-07-31 ENCOUNTER — PATIENT OUTREACH (OUTPATIENT)
Dept: ADMINISTRATIVE | Facility: HOSPITAL | Age: 70
End: 2020-07-31

## 2020-08-05 LAB
FINAL PATHOLOGIC DIAGNOSIS: NORMAL
GROSS: NORMAL

## 2020-08-07 NOTE — PROGRESS NOTES
Phoned patient with pathology results and instructions to repeat colonoscopy in one year.  Patient verbalized understanding.

## 2020-08-07 NOTE — PROGRESS NOTES
The polyps removed were tubular adenomas. They are not cancer but are pre cancerous and were removed. She needs a follow up colonoscopy in 1 year because of poor prep. Please inform her.    Molly Emerson MD  Gastroenterology

## 2020-08-19 NOTE — PROGRESS NOTES
I called pt to remind her of appt tomorrow at 3pm. Per pt request mammogram rescheduled for 2pm tomorrow 08-.

## 2020-08-20 ENCOUNTER — HOSPITAL ENCOUNTER (OUTPATIENT)
Dept: RADIOLOGY | Facility: HOSPITAL | Age: 70
Discharge: HOME | End: 2020-08-20
Attending: REGISTERED NURSE
Payer: MEDICARE

## 2020-08-20 VITALS — HEIGHT: 63 IN | BODY MASS INDEX: 40.94 KG/M2 | WEIGHT: 231.06 LBS

## 2020-08-20 DIAGNOSIS — Z12.39 BREAST CANCER SCREENING: ICD-10-CM

## 2020-08-20 DIAGNOSIS — Z00.00 HEALTHCARE MAINTENANCE: ICD-10-CM

## 2020-08-20 DIAGNOSIS — Z12.31 BREAST CANCER SCREENING BY MAMMOGRAM: ICD-10-CM

## 2020-08-20 PROCEDURE — 77067 SCR MAMMO BI INCL CAD: CPT | Mod: TC,HCNC

## 2020-08-20 PROCEDURE — 77063 MAMMO DIGITAL SCREENING BILAT WITH TOMOSYNTHESIS_CAD: ICD-10-PCS | Mod: 26,HCNC,, | Performed by: RADIOLOGY

## 2020-08-20 PROCEDURE — 77063 BREAST TOMOSYNTHESIS BI: CPT | Mod: 26,HCNC,, | Performed by: RADIOLOGY

## 2020-08-20 PROCEDURE — 77067 SCR MAMMO BI INCL CAD: CPT | Mod: 26,HCNC,, | Performed by: RADIOLOGY

## 2020-08-20 PROCEDURE — 77067 MAMMO DIGITAL SCREENING BILAT WITH TOMOSYNTHESIS_CAD: ICD-10-PCS | Mod: 26,HCNC,, | Performed by: RADIOLOGY

## 2020-08-27 DIAGNOSIS — B00.9 HSV-2 (HERPES SIMPLEX VIRUS 2) INFECTION: ICD-10-CM

## 2020-08-27 RX ORDER — VALACYCLOVIR HYDROCHLORIDE 1 G/1
1000 TABLET, FILM COATED ORAL DAILY
Qty: 5 TABLET | Refills: 2 | Status: SHIPPED | OUTPATIENT
Start: 2020-08-27 | End: 2020-09-24 | Stop reason: SDUPTHER

## 2020-09-24 DIAGNOSIS — B00.9 HSV-2 (HERPES SIMPLEX VIRUS 2) INFECTION: ICD-10-CM

## 2020-09-24 RX ORDER — VALACYCLOVIR HYDROCHLORIDE 1 G/1
1000 TABLET, FILM COATED ORAL DAILY
Qty: 5 TABLET | Refills: 2 | Status: SHIPPED | OUTPATIENT
Start: 2020-09-24 | End: 2020-10-23 | Stop reason: SDUPTHER

## 2020-09-30 DIAGNOSIS — R60.0 LOWER EXTREMITY EDEMA: ICD-10-CM

## 2020-10-01 ENCOUNTER — TELEPHONE (OUTPATIENT)
Dept: FAMILY MEDICINE | Facility: CLINIC | Age: 70
End: 2020-10-01

## 2020-10-01 RX ORDER — HYDROCHLOROTHIAZIDE 12.5 MG/1
CAPSULE ORAL
Qty: 90 CAPSULE | Refills: 0 | Status: SHIPPED | OUTPATIENT
Start: 2020-10-01 | End: 2021-12-09

## 2020-10-01 NOTE — TELEPHONE ENCOUNTER
----- Message from Kelby Fletcher sent at 10/1/2020  4:22 PM CDT -----  Contact: Yang Wilson would like a call back in regards to getting the status of a prescription that was sent over to doctor. Please call back at [phone].        Thanks  Zs

## 2020-10-01 NOTE — TELEPHONE ENCOUNTER
Please advise patient to come in for a follow up of her blood pressure. I will refill her medication for now.

## 2020-10-23 DIAGNOSIS — B00.9 HSV-2 (HERPES SIMPLEX VIRUS 2) INFECTION: ICD-10-CM

## 2020-10-23 RX ORDER — VALACYCLOVIR HYDROCHLORIDE 1 G/1
1000 TABLET, FILM COATED ORAL DAILY
Qty: 30 TABLET | Refills: 11 | Status: SHIPPED | OUTPATIENT
Start: 2020-10-23 | End: 2021-12-09

## 2020-10-23 NOTE — TELEPHONE ENCOUNTER
Did patient already us the 2 refills on this medication? Is she having multiple break outs? She may need to be on a daily medication.

## 2020-10-23 NOTE — TELEPHONE ENCOUNTER
Spoke to pt, she states she did use the 2 refills that was on original Rx, that she has been taking it daily. She is very interested in changing to a daily medication.

## 2020-10-23 NOTE — TELEPHONE ENCOUNTER
2/10/20 -- lov     ----- Message from Corinne Lucero sent at 10/23/2020  2:39 PM CDT -----  Type:  RX Refill Request    Who Called: pt  Refill or New Rx:refill  RX Name and Strength:valacyclovir   How is the patient currently taking it? (ex. 1XDay):1Xday  Is this a 30 day or 90 day RX:/  Preferred Pharmacy with phone number:.  [facility] on [address]  Local or Mail Order:local  Ordering Provider:Dr Peguero  Would the patient rather a call back or a response via [facility]? Call back   Best Call Back Number:[phone]  Additional Information: #       Thank you

## 2021-03-02 ENCOUNTER — PES CALL (OUTPATIENT)
Dept: ADMINISTRATIVE | Facility: CLINIC | Age: 71
End: 2021-03-02

## 2021-03-08 ENCOUNTER — TELEPHONE (OUTPATIENT)
Dept: ADMINISTRATIVE | Facility: HOSPITAL | Age: 71
End: 2021-03-08

## 2021-03-08 ENCOUNTER — TELEPHONE (OUTPATIENT)
Dept: FAMILY MEDICINE | Facility: CLINIC | Age: 71
End: 2021-03-08

## 2021-03-30 ENCOUNTER — TELEPHONE (OUTPATIENT)
Dept: ADMINISTRATIVE | Facility: HOSPITAL | Age: 71
End: 2021-03-30

## 2021-04-15 ENCOUNTER — PES CALL (OUTPATIENT)
Dept: ADMINISTRATIVE | Facility: CLINIC | Age: 71
End: 2021-04-15

## 2021-04-20 ENCOUNTER — OFFICE VISIT (OUTPATIENT)
Dept: FAMILY MEDICINE | Facility: CLINIC | Age: 71
End: 2021-04-20
Payer: MEDICARE

## 2021-04-20 VITALS
WEIGHT: 222 LBS | RESPIRATION RATE: 20 BRPM | SYSTOLIC BLOOD PRESSURE: 130 MMHG | BODY MASS INDEX: 39.34 KG/M2 | DIASTOLIC BLOOD PRESSURE: 70 MMHG | HEART RATE: 94 BPM | HEIGHT: 63 IN | TEMPERATURE: 97 F

## 2021-04-20 DIAGNOSIS — B37.31 VAGINAL YEAST INFECTION: ICD-10-CM

## 2021-04-20 DIAGNOSIS — F17.210 CIGARETTE NICOTINE DEPENDENCE WITHOUT COMPLICATION: ICD-10-CM

## 2021-04-20 DIAGNOSIS — Z00.00 ENCOUNTER FOR PREVENTIVE HEALTH EXAMINATION: Primary | ICD-10-CM

## 2021-04-20 DIAGNOSIS — E66.01 SEVERE OBESITY (BMI 35.0-35.9 WITH COMORBIDITY): ICD-10-CM

## 2021-04-20 DIAGNOSIS — B00.9 HSV-2 (HERPES SIMPLEX VIRUS 2) INFECTION: ICD-10-CM

## 2021-04-20 DIAGNOSIS — Z74.09 OTHER REDUCED MOBILITY: ICD-10-CM

## 2021-04-20 DIAGNOSIS — R60.9 EDEMA, UNSPECIFIED TYPE: ICD-10-CM

## 2021-04-20 PROCEDURE — 3008F BODY MASS INDEX DOCD: CPT | Mod: CPTII,S$GLB,, | Performed by: NURSE PRACTITIONER

## 2021-04-20 PROCEDURE — 3288F PR FALLS RISK ASSESSMENT DOCUMENTED: ICD-10-PCS | Mod: CPTII,S$GLB,, | Performed by: NURSE PRACTITIONER

## 2021-04-20 PROCEDURE — 1158F ADVNC CARE PLAN TLK DOCD: CPT | Mod: S$GLB,,, | Performed by: NURSE PRACTITIONER

## 2021-04-20 PROCEDURE — 1126F AMNT PAIN NOTED NONE PRSNT: CPT | Mod: S$GLB,,, | Performed by: NURSE PRACTITIONER

## 2021-04-20 PROCEDURE — G0439 PR MEDICARE ANNUAL WELLNESS SUBSEQUENT VISIT: ICD-10-PCS | Mod: S$GLB,,, | Performed by: NURSE PRACTITIONER

## 2021-04-20 PROCEDURE — 99999 PR PBB SHADOW E&M-EST. PATIENT-LVL IV: CPT | Mod: PBBFAC,,, | Performed by: NURSE PRACTITIONER

## 2021-04-20 PROCEDURE — 99499 RISK ADDL DX/OHS AUDIT: ICD-10-PCS | Mod: S$GLB,,, | Performed by: NURSE PRACTITIONER

## 2021-04-20 PROCEDURE — 3288F FALL RISK ASSESSMENT DOCD: CPT | Mod: CPTII,S$GLB,, | Performed by: NURSE PRACTITIONER

## 2021-04-20 PROCEDURE — 1101F PT FALLS ASSESS-DOCD LE1/YR: CPT | Mod: CPTII,S$GLB,, | Performed by: NURSE PRACTITIONER

## 2021-04-20 PROCEDURE — 99499 UNLISTED E&M SERVICE: CPT | Mod: S$GLB,,, | Performed by: NURSE PRACTITIONER

## 2021-04-20 PROCEDURE — G0439 PPPS, SUBSEQ VISIT: HCPCS | Mod: S$GLB,,, | Performed by: NURSE PRACTITIONER

## 2021-04-20 PROCEDURE — 1101F PR PT FALLS ASSESS DOC 0-1 FALLS W/OUT INJ PAST YR: ICD-10-PCS | Mod: CPTII,S$GLB,, | Performed by: NURSE PRACTITIONER

## 2021-04-20 PROCEDURE — 99999 PR PBB SHADOW E&M-EST. PATIENT-LVL IV: ICD-10-PCS | Mod: PBBFAC,,, | Performed by: NURSE PRACTITIONER

## 2021-04-20 PROCEDURE — 1158F PR ADVANCE CARE PLANNING DISCUSS DOCUMENTED IN MEDICAL RECORD: ICD-10-PCS | Mod: S$GLB,,, | Performed by: NURSE PRACTITIONER

## 2021-04-20 PROCEDURE — 3008F PR BODY MASS INDEX (BMI) DOCUMENTED: ICD-10-PCS | Mod: CPTII,S$GLB,, | Performed by: NURSE PRACTITIONER

## 2021-04-20 PROCEDURE — 1126F PR PAIN SEVERITY QUANTIFIED, NO PAIN PRESENT: ICD-10-PCS | Mod: S$GLB,,, | Performed by: NURSE PRACTITIONER

## 2021-04-20 RX ORDER — FLUCONAZOLE 150 MG/1
150 TABLET ORAL ONCE
Qty: 1 TABLET | Refills: 0 | Status: SHIPPED | OUTPATIENT
Start: 2021-04-20 | End: 2021-04-20

## 2021-04-20 RX ORDER — HYDROCHLOROTHIAZIDE 12.5 MG/1
12.5 TABLET ORAL DAILY
Qty: 30 TABLET | Refills: 11 | Status: SHIPPED | OUTPATIENT
Start: 2021-04-20 | End: 2021-12-09

## 2021-04-21 PROBLEM — E66.01 SEVERE OBESITY (BMI 35.0-35.9 WITH COMORBIDITY): Status: ACTIVE | Noted: 2021-04-21

## 2021-04-21 PROBLEM — F17.210 CIGARETTE NICOTINE DEPENDENCE: Status: ACTIVE | Noted: 2021-04-21

## 2021-04-21 PROBLEM — E66.9 OBESITY (BMI 35.0-39.9 WITHOUT COMORBIDITY): Status: ACTIVE | Noted: 2021-04-21

## 2021-12-22 ENCOUNTER — TELEPHONE (OUTPATIENT)
Dept: ADMINISTRATIVE | Facility: HOSPITAL | Age: 71
End: 2021-12-22
Payer: MEDICARE

## 2022-01-18 ENCOUNTER — OFFICE VISIT (OUTPATIENT)
Dept: FAMILY MEDICINE | Facility: CLINIC | Age: 72
End: 2022-01-18
Payer: MEDICARE

## 2022-01-18 VITALS
HEIGHT: 63 IN | BODY MASS INDEX: 41.79 KG/M2 | RESPIRATION RATE: 18 BRPM | DIASTOLIC BLOOD PRESSURE: 78 MMHG | TEMPERATURE: 97 F | HEART RATE: 95 BPM | OXYGEN SATURATION: 99 % | SYSTOLIC BLOOD PRESSURE: 139 MMHG | WEIGHT: 235.88 LBS

## 2022-01-18 DIAGNOSIS — G89.29 CHRONIC PAIN OF BOTH KNEES: ICD-10-CM

## 2022-01-18 DIAGNOSIS — E66.01 SEVERE OBESITY (BMI 35.0-35.9 WITH COMORBIDITY): ICD-10-CM

## 2022-01-18 DIAGNOSIS — N95.8 ARTIFICIAL MENOPAUSE: ICD-10-CM

## 2022-01-18 DIAGNOSIS — Z00.00 ENCOUNTER FOR PREVENTIVE HEALTH EXAMINATION: Primary | ICD-10-CM

## 2022-01-18 DIAGNOSIS — R03.0 ELEVATED BLOOD PRESSURE READING: ICD-10-CM

## 2022-01-18 DIAGNOSIS — Z12.11 SCREENING FOR COLON CANCER: ICD-10-CM

## 2022-01-18 DIAGNOSIS — R60.9 EDEMA, UNSPECIFIED TYPE: ICD-10-CM

## 2022-01-18 DIAGNOSIS — Z12.31 SCREENING MAMMOGRAM, ENCOUNTER FOR: ICD-10-CM

## 2022-01-18 DIAGNOSIS — M25.562 CHRONIC PAIN OF BOTH KNEES: ICD-10-CM

## 2022-01-18 DIAGNOSIS — H54.3 DECREASED VISION IN BOTH EYES: ICD-10-CM

## 2022-01-18 DIAGNOSIS — F17.210 CIGARETTE NICOTINE DEPENDENCE WITHOUT COMPLICATION: ICD-10-CM

## 2022-01-18 DIAGNOSIS — M25.561 CHRONIC PAIN OF BOTH KNEES: ICD-10-CM

## 2022-01-18 DIAGNOSIS — B00.9 HSV-2 (HERPES SIMPLEX VIRUS 2) INFECTION: ICD-10-CM

## 2022-01-18 PROCEDURE — G0439 PR MEDICARE ANNUAL WELLNESS SUBSEQUENT VISIT: ICD-10-PCS | Mod: HCNC,S$GLB,, | Performed by: NURSE PRACTITIONER

## 2022-01-18 PROCEDURE — 1170F PR FUNCTIONAL STATUS ASSESSED: ICD-10-PCS | Mod: HCNC,CPTII,S$GLB, | Performed by: NURSE PRACTITIONER

## 2022-01-18 PROCEDURE — 3075F PR MOST RECENT SYSTOLIC BLOOD PRESS GE 130-139MM HG: ICD-10-PCS | Mod: HCNC,CPTII,S$GLB, | Performed by: NURSE PRACTITIONER

## 2022-01-18 PROCEDURE — 3075F SYST BP GE 130 - 139MM HG: CPT | Mod: HCNC,CPTII,S$GLB, | Performed by: NURSE PRACTITIONER

## 2022-01-18 PROCEDURE — G0439 PPPS, SUBSEQ VISIT: HCPCS | Mod: HCNC,S$GLB,, | Performed by: NURSE PRACTITIONER

## 2022-01-18 PROCEDURE — 3008F PR BODY MASS INDEX (BMI) DOCUMENTED: ICD-10-PCS | Mod: HCNC,CPTII,S$GLB, | Performed by: NURSE PRACTITIONER

## 2022-01-18 PROCEDURE — 99999 PR PBB SHADOW E&M-EST. PATIENT-LVL IV: ICD-10-PCS | Mod: PBBFAC,HCNC,, | Performed by: NURSE PRACTITIONER

## 2022-01-18 PROCEDURE — 1126F PR PAIN SEVERITY QUANTIFIED, NO PAIN PRESENT: ICD-10-PCS | Mod: HCNC,CPTII,S$GLB, | Performed by: NURSE PRACTITIONER

## 2022-01-18 PROCEDURE — 3078F PR MOST RECENT DIASTOLIC BLOOD PRESSURE < 80 MM HG: ICD-10-PCS | Mod: HCNC,CPTII,S$GLB, | Performed by: NURSE PRACTITIONER

## 2022-01-18 PROCEDURE — 99999 PR PBB SHADOW E&M-EST. PATIENT-LVL IV: CPT | Mod: PBBFAC,HCNC,, | Performed by: NURSE PRACTITIONER

## 2022-01-18 PROCEDURE — 1170F FXNL STATUS ASSESSED: CPT | Mod: HCNC,CPTII,S$GLB, | Performed by: NURSE PRACTITIONER

## 2022-01-18 PROCEDURE — 1126F AMNT PAIN NOTED NONE PRSNT: CPT | Mod: HCNC,CPTII,S$GLB, | Performed by: NURSE PRACTITIONER

## 2022-01-18 PROCEDURE — 3078F DIAST BP <80 MM HG: CPT | Mod: HCNC,CPTII,S$GLB, | Performed by: NURSE PRACTITIONER

## 2022-01-18 PROCEDURE — 3008F BODY MASS INDEX DOCD: CPT | Mod: HCNC,CPTII,S$GLB, | Performed by: NURSE PRACTITIONER

## 2022-01-18 PROCEDURE — 99499 RISK ADDL DX/OHS AUDIT: ICD-10-PCS | Mod: S$PBB,HCNC,, | Performed by: NURSE PRACTITIONER

## 2022-01-18 PROCEDURE — 99499 UNLISTED E&M SERVICE: CPT | Mod: S$PBB,HCNC,, | Performed by: NURSE PRACTITIONER

## 2022-01-18 NOTE — Clinical Note
Your patient was seen today for a HRA visit.   Annual scheduled on 12/24/ 2022 I have included a copy of my visit note, please review the note and feel free to contact me with any questions.  Thank you for allowing me to participate in the care of your patients.  Racquel Lazo NP

## 2022-01-18 NOTE — PROGRESS NOTES
"  Triny Mandel presented for a  Medicare AWV and comprehensive Health Risk Assessment today. The following components were reviewed and updated:    · Medical history  · Family History  · Social history  · Allergies and Current Medications  · Health Risk Assessment  · Health Maintenance  · Care Team         ** See Completed Assessments for Annual Wellness Visit within the encounter summary.**         The following assessments were completed:  · Living Situation  · CAGE  · Depression Screening  · Timed Get Up and Go  · Whisper Test  · Cognitive Function Screening  · Nutrition Screening  · ADL Screening  · PAQ Screening        Vitals:    01/18/22 1506   BP: 139/78   Pulse: 95   Resp: 18   Temp: 97 °F (36.1 °C)   SpO2: 99%   Weight: 107 kg (235 lb 14.3 oz)   Height: 5' 3" (1.6 m)     Body mass index is 41.79 kg/m².  Physical Exam  Vitals and nursing note reviewed.   Constitutional:       Appearance: Normal appearance. She is well-developed and well-nourished.   HENT:      Head: Normocephalic and atraumatic.   Eyes:      Pupils: Pupils are equal, round, and reactive to light.   Neck:      Vascular: No carotid bruit.   Cardiovascular:      Rate and Rhythm: Normal rate and regular rhythm.      Pulses: Normal pulses and intact distal pulses.      Heart sounds: Normal heart sounds. No murmur heard.  No gallop.    Pulmonary:      Effort: Pulmonary effort is normal.      Breath sounds: Normal breath sounds.   Abdominal:      General: Bowel sounds are normal. There is no distension.      Palpations: Abdomen is soft.      Tenderness: There is no abdominal tenderness.   Musculoskeletal:         General: No tenderness or edema. Normal range of motion.   Skin:     General: Skin is warm, dry and intact.   Neurological:      Mental Status: She is alert.      Motor: No abnormal muscle tone.   Psychiatric:         Mood and Affect: Mood and affect normal.         Speech: Speech normal.         Behavior: Behavior normal.         " Thought Content: Thought content normal.         Cognition and Memory: Cognition and memory normal.         Judgment: Judgment normal.         Current Outpatient Medications:     hydroCHLOROthiazide (HYDRODIURIL) 12.5 MG Tab, TAKE 1 TABLET (12.5 MG TOTAL) BY MOUTH ONCE DAILY., Disp: 90 tablet, Rfl: 0    valACYclovir (VALTREX) 1000 MG tablet, TAKE 1 TABLET BY MOUTH ONCE DAILY., Disp: 30 tablet, Rfl: 3          Diagnoses and health risks identified today and associated recommendations/orders:    1. Encounter for preventive health examination   decline flu     2. Elevated blood pressure reading  This is a new problem that has been identified during today's visit.  Instructed to mopinter and record  Schedule annual with PCP 1/24/ 22    3. Screening mammogram, encounter for   Mammo Digital Screening Bilat SCHEDULE 2/14/ 2021    4. Severe obesity (BMI 35.0-35.9 with comorbidity)  Chronic and Ongoing.   BMI 42 kjg -reinforce ada diet and exerisce- follow up with PCP    5. Cigarette nicotine dependence without complication  Chronic and Ongoing. Assistance with smoking cessation was offered, including:  []  Medications  []  Counseling  []  Printed Information on Smoking Cessation  [x]  Referral to a Smoking Cessation Program  Pt decline- follow upw ith PCp      6. HSV-2 (herpes simplex virus 2) infection  Chronic and Stable. No recent flare ups  Continue current treatment plan as previously prescribed with your PCP    7. Edema, unspecified type  Chronic and Ongoing on HCTZ. Continue current treatment plan as previously prescribed with your PCP    8. Chronic pain of both knees  This is a new problem that has been identified during today's visit. F/UWIT PCP 1/24/ 2    9. Artificial menopause   DEXA Bone Density Spine And Hip SCHEDULE FOR  2/14/ 2021    10. Decreased vision in both eyes   Ambulatory referral/consult to Optometry due for  New eye glasses -referral sent     11. Screening for colon cancer   Case Request  Endoscopy: COLONOSCOPY referral sent - due to poor prep 7/ 20220     I offered to discuss advanced care planning, including how to pick a person who would make decisions for you if you were unable to make them for yourself, called a health care power of , and what kind of decisions you might make such as use of life sustaining treatments such as ventilators and tube feeding when faced with a life limiting illness recorded on a living will that they will need to know. (How you want to be cared for as you near the end of your natural life)     X Patient is interested in learning more about how to make advanced directives.  I provided them paperwork and offered to discuss this with them.    Provided Triny with a 5-10 year written screening schedule and personal prevention plan. Recommendations were developed using the USPSTF age appropriate recommendations. Education, counseling, and referrals were provided as needed. After Visit Summary printed and given to patient which includes a list of additional screenings\tests needed.     1 year annual   Racquel Lazo NP

## 2022-01-18 NOTE — PATIENT INSTRUCTIONS
Counseling and Referral of Other Preventative  (Italic type indicates deductible and co-insurance are waived)    Patient Name: Triny Mandel  Today's Date: 1/18/2022    Health Maintenance       Date Due Completion Date    TETANUS VACCINE Never done ---    DEXA SCAN Never done ---    LDCT Lung Screen Never done ---    Shingles Vaccine (1 of 2) Never done ---    Pneumococcal Vaccines (Age 65+) (1 of 2 - PPSV23) 04/06/2020 2/10/2020    Colorectal Cancer Screening 07/30/2021 7/30/2020    Override on 7/30/2020: Done (due 1 year due to suboptimal bowel cleanse)    Mammogram 08/20/2021 8/20/2020    Influenza Vaccine (1) Never done ---    COVID-19 Vaccine (3 - Booster for Pfizer series) 11/15/2021 5/15/2021    Lipid Panel 02/10/2025 2/10/2020        Orders Placed This Encounter   Procedures    Mammo Digital Screening Bilat    DXA Bone Density Spine And Hip     The following information is provided to all patients.  This information is to help you find resources for any of the problems found today that may be affecting your health:                Living healthy guide: www.[facility].[place].gov      Understanding Diabetes: www.diabetes.org      Eating healthy: www.cdc.gov/healthyweight      CDC home safety checklist: www.cdc.gov/steadi/patient.html      Agency on Aging: www.goea.[place].[facility]      Alcoholics anonymous (AA): www.aa.org      Physical Activity: www.mac.nih.gov/yv6bmmt      Tobacco use: www.quitwithusla.org

## 2022-01-24 ENCOUNTER — OFFICE VISIT (OUTPATIENT)
Dept: FAMILY MEDICINE | Facility: CLINIC | Age: 72
End: 2022-01-24
Payer: MEDICARE

## 2022-01-24 VITALS
BODY MASS INDEX: 41.46 KG/M2 | HEIGHT: 63 IN | SYSTOLIC BLOOD PRESSURE: 117 MMHG | HEART RATE: 99 BPM | OXYGEN SATURATION: 96 % | WEIGHT: 234 LBS | TEMPERATURE: 97 F | DIASTOLIC BLOOD PRESSURE: 75 MMHG

## 2022-01-24 DIAGNOSIS — M79.671 RIGHT FOOT PAIN: ICD-10-CM

## 2022-01-24 DIAGNOSIS — B00.9 HSV-2 (HERPES SIMPLEX VIRUS 2) INFECTION: ICD-10-CM

## 2022-01-24 DIAGNOSIS — Z00.00 ENCOUNTER FOR PREVENTIVE HEALTH EXAMINATION: Primary | ICD-10-CM

## 2022-01-24 DIAGNOSIS — Z12.2 SCREENING FOR LUNG CANCER: ICD-10-CM

## 2022-01-24 DIAGNOSIS — F17.210 CIGARETTE NICOTINE DEPENDENCE WITHOUT COMPLICATION: ICD-10-CM

## 2022-01-24 DIAGNOSIS — Z12.11 SCREENING FOR COLON CANCER: ICD-10-CM

## 2022-01-24 DIAGNOSIS — R73.03 PREDIABETES: ICD-10-CM

## 2022-01-24 PROCEDURE — 1159F MED LIST DOCD IN RCRD: CPT | Mod: HCNC,CPTII,S$GLB, | Performed by: FAMILY MEDICINE

## 2022-01-24 PROCEDURE — 99999 PR PBB SHADOW E&M-EST. PATIENT-LVL III: ICD-10-PCS | Mod: PBBFAC,HCNC,, | Performed by: FAMILY MEDICINE

## 2022-01-24 PROCEDURE — 3074F SYST BP LT 130 MM HG: CPT | Mod: HCNC,CPTII,S$GLB, | Performed by: FAMILY MEDICINE

## 2022-01-24 PROCEDURE — 3008F BODY MASS INDEX DOCD: CPT | Mod: HCNC,CPTII,S$GLB, | Performed by: FAMILY MEDICINE

## 2022-01-24 PROCEDURE — 3074F PR MOST RECENT SYSTOLIC BLOOD PRESSURE < 130 MM HG: ICD-10-PCS | Mod: HCNC,CPTII,S$GLB, | Performed by: FAMILY MEDICINE

## 2022-01-24 PROCEDURE — 3078F DIAST BP <80 MM HG: CPT | Mod: HCNC,CPTII,S$GLB, | Performed by: FAMILY MEDICINE

## 2022-01-24 PROCEDURE — 99397 PR PREVENTIVE VISIT,EST,65 & OVER: ICD-10-PCS | Mod: HCNC,S$GLB,, | Performed by: FAMILY MEDICINE

## 2022-01-24 PROCEDURE — 3288F FALL RISK ASSESSMENT DOCD: CPT | Mod: HCNC,CPTII,S$GLB, | Performed by: FAMILY MEDICINE

## 2022-01-24 PROCEDURE — 99397 PER PM REEVAL EST PAT 65+ YR: CPT | Mod: HCNC,S$GLB,, | Performed by: FAMILY MEDICINE

## 2022-01-24 PROCEDURE — 3008F PR BODY MASS INDEX (BMI) DOCUMENTED: ICD-10-PCS | Mod: HCNC,CPTII,S$GLB, | Performed by: FAMILY MEDICINE

## 2022-01-24 PROCEDURE — 1101F PR PT FALLS ASSESS DOC 0-1 FALLS W/OUT INJ PAST YR: ICD-10-PCS | Mod: HCNC,CPTII,S$GLB, | Performed by: FAMILY MEDICINE

## 2022-01-24 PROCEDURE — 1159F PR MEDICATION LIST DOCUMENTED IN MEDICAL RECORD: ICD-10-PCS | Mod: HCNC,CPTII,S$GLB, | Performed by: FAMILY MEDICINE

## 2022-01-24 PROCEDURE — 1125F AMNT PAIN NOTED PAIN PRSNT: CPT | Mod: HCNC,CPTII,S$GLB, | Performed by: FAMILY MEDICINE

## 2022-01-24 PROCEDURE — 3078F PR MOST RECENT DIASTOLIC BLOOD PRESSURE < 80 MM HG: ICD-10-PCS | Mod: HCNC,CPTII,S$GLB, | Performed by: FAMILY MEDICINE

## 2022-01-24 PROCEDURE — 1101F PT FALLS ASSESS-DOCD LE1/YR: CPT | Mod: HCNC,CPTII,S$GLB, | Performed by: FAMILY MEDICINE

## 2022-01-24 PROCEDURE — 99999 PR PBB SHADOW E&M-EST. PATIENT-LVL III: CPT | Mod: PBBFAC,HCNC,, | Performed by: FAMILY MEDICINE

## 2022-01-24 PROCEDURE — 1125F PR PAIN SEVERITY QUANTIFIED, PAIN PRESENT: ICD-10-PCS | Mod: HCNC,CPTII,S$GLB, | Performed by: FAMILY MEDICINE

## 2022-01-24 PROCEDURE — 3288F PR FALLS RISK ASSESSMENT DOCUMENTED: ICD-10-PCS | Mod: HCNC,CPTII,S$GLB, | Performed by: FAMILY MEDICINE

## 2022-01-24 RX ORDER — VALACYCLOVIR HYDROCHLORIDE 1 G/1
1000 TABLET, FILM COATED ORAL EVERY 12 HOURS
Qty: 20 TABLET | Refills: 0 | Status: SHIPPED | OUTPATIENT
Start: 2022-01-24 | End: 2023-01-23

## 2022-01-24 NOTE — PROGRESS NOTES
Subjective:      Patient ID: Triny Mandel is a 71 y.o. female.    Chief Complaint: Annual Exam    HPI    Patient with pmhx of prediabetes here today for annual exam   Does note that she is having some skin irritation, painful to site, history of HSV, using vasa line. X a few weeks. In genital area and in buttocks.    Past Medical History:   Diagnosis Date    Arthritis     Herpes simplex        Past Surgical History:   Procedure Laterality Date    COLONOSCOPY N/A 7/30/2020    Procedure: COLONOSCOPY;  Surgeon: Molly Emerson MD;  Location: [facility];  Service: Endoscopy;  Laterality: N/A;    HYSTERECTOMY      unclear - doesn't think it was due to cancer       Family History   Problem Relation Age of Onset    Cancer Mother         breast cancer    No Known Problems Sister     No Known Problems Brother     No Known Problems Daughter     No Known Problems Son     No Known Problems Brother     No Known Problems Son        Social History     Socioeconomic History    Marital status:     Number of children: 4   Occupational History    Occupation: work at [employer]    Tobacco Use    Smoking status: Current Every Day Smoker     Packs/day: 1.00     Years: 45.00     Pack years: 45.00    Smokeless tobacco: Never Used   Substance and Sexual Activity    Alcohol use: Yes     Comment: every hoilday     Drug use: Never    Sexual activity: Not Currently     Partners: Male       Health Maintenance Topics with due status: Not Due       Topic Last Completion Date    Lipid Panel 02/10/2020       Medication List with Changes/Refills   Current Medications    HYDROCHLOROTHIAZIDE (HYDRODIURIL) 12.5 MG TAB    TAKE 1 TABLET (12.5 MG TOTAL) BY MOUTH ONCE DAILY.   Changed and/or Refilled Medications    Modified Medication Previous Medication    VALACYCLOVIR (VALTREX) 1000 MG TABLET valACYclovir (VALTREX) 1000 MG tablet       Take 1 tablet (1,000 mg total) by mouth every 12 (twelve) hours. for 10  days    TAKE 1 TABLET BY MOUTH ONCE DAILY.       Review of patient's allergies indicates:  No Known Allergies    Review of Systems   Constitutional: Negative for fever.   HENT: Negative for congestion.    Eyes: Negative for blurred vision.   Respiratory: Negative for shortness of breath.    Cardiovascular: Negative for chest pain and leg swelling.   Gastrointestinal: Negative for abdominal pain, constipation and diarrhea.   Genitourinary: Negative for dysuria.   Skin: Positive for itching and rash.   Neurological: Negative for headaches.       Objective:     Vitals:    01/24/22 1640   BP: 117/75   Pulse: 99   Temp: 97.4 °F (36.3 °C)     Body mass index is 41.45 kg/m².    Physical Exam  Vitals and nursing note reviewed.   Constitutional:       General: She is not in acute distress.     Appearance: She is well-developed and well-nourished.   HENT:      Head: Normocephalic and atraumatic.      Right Ear: External ear normal.      Left Ear: External ear normal.      Nose: Nose normal.      Mouth/Throat:      Mouth: Oropharynx is clear and moist.   Eyes:      Extraocular Movements: EOM normal.      Conjunctiva/sclera: Conjunctivae normal.      Pupils: Pupils are equal, round, and reactive to light.   Neck:      Thyroid: No thyromegaly.   Cardiovascular:      Rate and Rhythm: Normal rate and regular rhythm.      Pulses: Intact distal pulses.      Heart sounds: Normal heart sounds. No murmur heard.      Pulmonary:      Effort: Pulmonary effort is normal. No respiratory distress.      Breath sounds: Normal breath sounds. No wheezing or rales.   Chest:      Chest wall: No tenderness.   Abdominal:      General: Bowel sounds are normal. There is no distension.      Palpations: Abdomen is soft.      Tenderness: There is no abdominal tenderness.   Genitourinary:     Comments: HSV lesions in area of concern  Musculoskeletal:         General: No edema.   Lymphadenopathy:      Cervical: No cervical adenopathy.   Skin:     General:  Skin is warm and dry.   Neurological:      Mental Status: She is alert and oriented to person, place, and time.   Psychiatric:         Mood and Affect: Mood and affect normal.         Assessment and Plan:     Encounter for preventive health examination  -     CBC Without Differential; Future; Expected date: 01/24/2022  -     Comprehensive Metabolic Panel; Future; Expected date: 01/24/2022  -     Hemoglobin A1C; Future; Expected date: 01/24/2022  -     Lipid Panel; Future; Expected date: 01/24/2022  -     TSH; Future; Expected date: 01/24/2022  -     CT Chest Lung Screening Low Dose; Future; Expected date: 01/24/2022  -     Case Request Endoscopy: COLONOSCOPY  -     Urinalysis; Future; Expected date: 01/24/2022  Age appropriate counseling    Screening for lung cancer  -     CT Chest Lung Screening Low Dose; Future; Expected date: 01/24/2022    Screening for colon cancer  -     Case Request Endoscopy: COLONOSCOPY    Cigarette nicotine dependence without complication  -     CT Chest Lung Screening Low Dose; Future; Expected date: 01/24/2022    Prediabetes  -     Hemoglobin A1C; Future; Expected date: 01/24/2022    Acute HSV-2 (herpes simplex virus 2) infection  Increase valtrex to 1000 bid x 10 days      Follow up in about 1 year (around 1/24/2023) for wellness .

## 2022-03-02 ENCOUNTER — PATIENT OUTREACH (OUTPATIENT)
Dept: ADMINISTRATIVE | Facility: OTHER | Age: 72
End: 2022-03-02
Payer: MEDICARE

## 2022-03-02 DIAGNOSIS — M79.671 RIGHT FOOT PAIN: Primary | ICD-10-CM

## 2022-03-03 ENCOUNTER — OFFICE VISIT (OUTPATIENT)
Dept: PODIATRY | Facility: CLINIC | Age: 72
End: 2022-03-03
Payer: MEDICARE

## 2022-03-03 ENCOUNTER — HOSPITAL ENCOUNTER (OUTPATIENT)
Dept: RADIOLOGY | Facility: HOSPITAL | Age: 72
Discharge: HOME | End: 2022-03-03
Attending: PODIATRIST
Payer: MEDICARE

## 2022-03-03 VITALS — BODY MASS INDEX: 41.46 KG/M2 | WEIGHT: 234 LBS | HEIGHT: 63 IN

## 2022-03-03 DIAGNOSIS — M79.671 RIGHT FOOT PAIN: ICD-10-CM

## 2022-03-03 DIAGNOSIS — M21.41 ACQUIRED PES PLANUS OF RIGHT FOOT: ICD-10-CM

## 2022-03-03 DIAGNOSIS — M25.471 ANKLE EDEMA, BILATERAL: ICD-10-CM

## 2022-03-03 DIAGNOSIS — M19.279 OSTEOARTHRITIS OF MIDFOOT DUE TO INFLAMMATORY ARTHRITIS: Primary | ICD-10-CM

## 2022-03-03 DIAGNOSIS — M25.472 ANKLE EDEMA, BILATERAL: ICD-10-CM

## 2022-03-03 DIAGNOSIS — M19.90 OSTEOARTHRITIS OF MIDFOOT DUE TO INFLAMMATORY ARTHRITIS: Primary | ICD-10-CM

## 2022-03-03 DIAGNOSIS — M21.42 ACQUIRED PES PLANUS, LEFT: ICD-10-CM

## 2022-03-03 PROCEDURE — 3288F PR FALLS RISK ASSESSMENT DOCUMENTED: ICD-10-PCS | Mod: HCNC,CPTII,S$GLB, | Performed by: PODIATRIST

## 2022-03-03 PROCEDURE — 1159F MED LIST DOCD IN RCRD: CPT | Mod: HCNC,CPTII,S$GLB, | Performed by: PODIATRIST

## 2022-03-03 PROCEDURE — 1125F PR PAIN SEVERITY QUANTIFIED, PAIN PRESENT: ICD-10-PCS | Mod: HCNC,CPTII,S$GLB, | Performed by: PODIATRIST

## 2022-03-03 PROCEDURE — 3008F BODY MASS INDEX DOCD: CPT | Mod: HCNC,CPTII,S$GLB, | Performed by: PODIATRIST

## 2022-03-03 PROCEDURE — 99999 PR PBB SHADOW E&M-EST. PATIENT-LVL III: ICD-10-PCS | Mod: PBBFAC,HCNC,, | Performed by: PODIATRIST

## 2022-03-03 PROCEDURE — 1101F PT FALLS ASSESS-DOCD LE1/YR: CPT | Mod: HCNC,CPTII,S$GLB, | Performed by: PODIATRIST

## 2022-03-03 PROCEDURE — 3008F PR BODY MASS INDEX (BMI) DOCUMENTED: ICD-10-PCS | Mod: HCNC,CPTII,S$GLB, | Performed by: PODIATRIST

## 2022-03-03 PROCEDURE — 1101F PR PT FALLS ASSESS DOC 0-1 FALLS W/OUT INJ PAST YR: ICD-10-PCS | Mod: HCNC,CPTII,S$GLB, | Performed by: PODIATRIST

## 2022-03-03 PROCEDURE — 73630 X-RAY EXAM OF FOOT: CPT | Mod: 26,HCNC,RT, | Performed by: RADIOLOGY

## 2022-03-03 PROCEDURE — 73630 XR FOOT COMPLETE 3 VIEW RIGHT: ICD-10-PCS | Mod: 26,HCNC,RT, | Performed by: RADIOLOGY

## 2022-03-03 PROCEDURE — 3288F FALL RISK ASSESSMENT DOCD: CPT | Mod: HCNC,CPTII,S$GLB, | Performed by: PODIATRIST

## 2022-03-03 PROCEDURE — 99204 OFFICE O/P NEW MOD 45 MIN: CPT | Mod: HCNC,S$GLB,, | Performed by: PODIATRIST

## 2022-03-03 PROCEDURE — 1125F AMNT PAIN NOTED PAIN PRSNT: CPT | Mod: HCNC,CPTII,S$GLB, | Performed by: PODIATRIST

## 2022-03-03 PROCEDURE — 73630 X-RAY EXAM OF FOOT: CPT | Mod: TC,HCNC,RT

## 2022-03-03 PROCEDURE — 1159F PR MEDICATION LIST DOCUMENTED IN MEDICAL RECORD: ICD-10-PCS | Mod: HCNC,CPTII,S$GLB, | Performed by: PODIATRIST

## 2022-03-03 PROCEDURE — 99204 PR OFFICE/OUTPT VISIT, NEW, LEVL IV, 45-59 MIN: ICD-10-PCS | Mod: HCNC,S$GLB,, | Performed by: PODIATRIST

## 2022-03-03 PROCEDURE — 99999 PR PBB SHADOW E&M-EST. PATIENT-LVL III: CPT | Mod: PBBFAC,HCNC,, | Performed by: PODIATRIST

## 2022-03-03 RX ORDER — DICLOFENAC SODIUM 10 MG/G
2 GEL TOPICAL 2 TIMES DAILY
Qty: 60 G | Refills: 2 | Status: SHIPPED | OUTPATIENT
Start: 2022-03-03 | End: 2023-01-23

## 2022-03-03 RX ORDER — MELOXICAM 7.5 MG/1
7.5 TABLET ORAL DAILY
Qty: 10 TABLET | Refills: 0 | Status: SHIPPED | OUTPATIENT
Start: 2022-03-03 | End: 2022-03-13

## 2022-03-03 NOTE — PATIENT INSTRUCTIONS
You can also purchase Compression stockings: BELOW KNEE over the counter from your local [facility] for approximately $19.95 in either low, medium, or high grade compression.    Putting on Compression Stockings              Elastic compression stockings are prescribed to treat many vein problems. Wearing them may be the most important thing you do to manage your symptoms. The stockings fit tightly around your ankle, gradually reducing in pressure as they go up your legs. This helps keep blood flowing to your heart. As a result, swelling is reduced. Your doctor will prescribe stockings at a safe pressure for you. He or she will also tell you how often to wear and remove the stockings. Follow these instructions closely. Also, do not buy or wear compression stockings without first seeing your doctor.      Tips for Wear and Care  To wear stockings safely and to get the most benefit:  Wear the length prescribed by your doctor.  Pull them to the designated height and no farther. Dont let them bunch at the top. This can restrict blood flow and increase swelling.  Wear the stockings for the amount of time your doctor recommends. Replace them when they start to feel loose. This will likely be every 3 to 6 months.  Remove them as your doctor directs. When removed, wash your legs. Then check your legs and feet for sores. Call your doctor if you find a sore. Dont put the stockings back on unless your doctor directs.  Wash the stockings as instructed. They may need to be handwashed.  © 3472-3525 [employer]. [address] [place] [address], [place] [address]. All rights reserved. This information is not intended as a substitute for professional medical care. Always follow your healthcare professional's instructions.

## 2022-03-03 NOTE — PROGRESS NOTES
PODIATRIC MEDICINE AND SURGERY  3/3/2022    PCP: Dr. Natalya Peguero MD    CHIEF COMPLAINT   Chief Complaint   Patient presents with    Foot Pain     C/o dorsal foot pain, right, x several months, rates pain 9/10, 0 injury, non-diabetic, wears sandals, last seen PCP Dr. Peguero on 01/24/22       HPI:    Triny Mandel is a 71 y.o. female who has a past medical history of Arthritis and Herpes simplex.   Triny presents to clinic today complaining of dorsal foot pain. Pt states pain has been present for several months. Pain is intermittnent but when it does occur it is 9/10. She describes pain as a sharp, heavy weight on top of her right foot. She denies any trauma. Pain is not present when NWB. She has tried Advil which does help. Pain is worse with weather changes.    Patient denies other pedal complaints at this time.     PMH  Past Medical History:   Diagnosis Date    Arthritis     Herpes simplex        PROBLEM LIST  Patient Active Problem List    Diagnosis Date Noted    Severe obesity (BMI 35.0-35.9 with comorbidity) 04/21/2021    Cigarette nicotine dependence 04/21/2021    Edema 04/20/2021    Vaginal yeast infection 04/20/2021    HSV-2 (herpes simplex virus 2) infection 02/10/2020       MEDS  Current Outpatient Medications on File Prior to Visit   Medication Sig Dispense Refill    hydroCHLOROthiazide (HYDRODIURIL) 12.5 MG Tab TAKE 1 TABLET (12.5 MG TOTAL) BY MOUTH ONCE DAILY. 90 tablet 0    valACYclovir (VALTREX) 1000 MG tablet Take 1 tablet (1,000 mg total) by mouth every 12 (twelve) hours. for 10 days 20 tablet 0     No current facility-administered medications on file prior to visit.       Medication List with Changes/Refills   New Medications    DICLOFENAC SODIUM (VOLTAREN) 1 % GEL    Apply 2 g topically 2 (two) times daily. As needed for foot pain    MELOXICAM (MOBIC) 7.5 MG TABLET    Take 1 tablet (7.5 mg total) by mouth once daily. for 10 days   Current Medications    HYDROCHLOROTHIAZIDE  "(HYDRODIURIL) 12.5 MG TAB    TAKE 1 TABLET (12.5 MG TOTAL) BY MOUTH ONCE DAILY.    VALACYCLOVIR (VALTREX) 1000 MG TABLET    Take 1 tablet (1,000 mg total) by mouth every 12 (twelve) hours. for 10 days       PSH     Past Surgical History:   Procedure Laterality Date    COLONOSCOPY N/A 7/30/2020    Procedure: COLONOSCOPY;  Surgeon: Molly Emerson MD;  Location: [facility];  Service: Endoscopy;  Laterality: N/A;    HYSTERECTOMY      unclear - doesn't think it was due to cancer        ALL  Review of patient's allergies indicates:  No Known Allergies    SOC     Social History     Tobacco Use    Smoking status: Current Every Day Smoker     Packs/day: 1.00     Years: 45.00     Pack years: 45.00    Smokeless tobacco: Never Used   Substance Use Topics    Alcohol use: Yes     Comment: every hoilday     Drug use: Never         FAMILY HX    Family History   Problem Relation Age of Onset    Cancer Mother         breast cancer    No Known Problems Sister     No Known Problems Brother     No Known Problems Daughter     No Known Problems Son     No Known Problems Brother     No Known Problems Son             REVIEW OF SYSTEMS  General: This patient is well-developed, well-nourished and appears stated age, well-oriented to person, place and time, and cooperative and pleasant on today's visit  Constitutional: Negative for chills and fever.   Respiratory: Negative for shortness of breath.    Cardiovascular: Negative for chest pain, palpitations, orthopnea  Gastrointestinal: Negative for diarrhea, nausea and vomiting.   Musculoskeletal: Positive for above noted in HPI  Skin: Positive for skin and/or nail changes   Neurological: Negative for tingling and sensory changes  Peripheral Vascular: no claudication or cyanosis  Psychiatric/Behavioral: Negative for altered mental status     PHYSICAL EXAM:      Vitals:    03/03/22 1522   Weight: 106.1 kg (234 lb)   Height: 5' 3" (1.6 m)   PainSc:   9         LOWER EXTREMITY " PHYSICAL EXAM  VASCULAR  Dorsalis pedis and posterior tibial pulses palpable 2/4 bilaterally. Capillary refill time immediate to the toes. Feet are warm to the touch. Skin temperature warm to warm from proximally to distally There are no varicosities, telangiectasias noted to bilateral foot and ankle regions. There are no ecchymoses noted to bilateral foot and ankle regions. There is mild b/l ankle gross lower extremity edema.    DERMATOLOGIC  Skin moist with healthy texture and turgor.There are no open ulcerations, lacerations, or fissures to bilateral foot and ankle regions. There are no signs of infection as there is no erythema, no proximal-extending lymphangiitis, no fluctuance, or crepitus noted on palpation to bilateral foot and ankle regions. There is no interdigital maceration.     NEUROLOGIC  Epicritic sensation is intact as the patient is able to sense light touch to bilateral foot and ankle regions. Achilles and patellar deep tendon reflexes intact. Babinski reflex absent    ORTHOPEDIC/BIOMECHANICAL  Mild TTP RIGHT Midfoot.  Muscle strength AT/EHL/EDL/PT: 5/5; Achilles/Gastroc/Soleus: 5/5; PB/PL: 5/5 Muscle tone is normal. Ankle joint ROM non painful with DF/PF, non-crepitus; STJ ROM  Inv/ev non painful, non crepitus ; MTPJ b/l  DF/PF, non crepitus ; Foot type: pronated    IMAGING   Reviewed by me and I agree with radiologist findings    ASSESSMENT     Encounter Diagnoses   Name Primary?   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 As needed for foot pain  Dispense: 60 g; Refill: 2  -     meloxicam (MOBIC) 7.5 MG tablet; Take 1 tablet (7.5 mg total) by mouth once daily. for 10 days  Dispense: 10 tablet; Refill: 0      Reviewed xrays and discussed with patient treatment options  Will start with Mobic x 10 days and topical pain cream  Rx for inserts and tennis shoes to accommodate for pronated foot type   Compression therapy recommended for ankle edema  RTC in 4-6 weeks      Disclaimer:  This note may have been prepared using voice recognition software, it may have not been extensively proofed, as such there could be errors within the text such as sound alike errors.         Future Appointments   Date Time Provider Department Center   1/23/2023  4:20 PM Natalya Peguero MD [facility] MED [address]       Report Electronically Signed By:     Sherry Muir DPM   Podiatry  [facility]-   3/3/2022

## 2022-04-04 ENCOUNTER — TELEPHONE (OUTPATIENT)
Dept: ADMINISTRATIVE | Facility: HOSPITAL | Age: 72
End: 2022-04-04
Payer: MEDICARE

## 2022-04-04 DIAGNOSIS — Z00.00 ENCOUNTER FOR PREVENTIVE HEALTH EXAMINATION: Primary | ICD-10-CM

## 2022-04-04 DIAGNOSIS — Z12.11 COLON CANCER SCREENING: ICD-10-CM

## 2022-04-14 ENCOUNTER — HOSPITAL ENCOUNTER (OUTPATIENT)
Dept: PREADMISSION TESTING | Facility: HOSPITAL | Age: 72
Discharge: HOME | End: 2022-04-14
Attending: FAMILY MEDICINE
Payer: MEDICARE

## 2022-04-14 DIAGNOSIS — Z12.11 COLON CANCER SCREENING: Primary | ICD-10-CM

## 2022-04-14 DIAGNOSIS — Z00.00 ENCOUNTER FOR PREVENTIVE HEALTH EXAMINATION: ICD-10-CM

## 2022-05-10 ENCOUNTER — PATIENT OUTREACH (OUTPATIENT)
Dept: ADMINISTRATIVE | Facility: OTHER | Age: 72
End: 2022-05-10
Payer: MEDICARE

## 2022-05-11 ENCOUNTER — OFFICE VISIT (OUTPATIENT)
Dept: OPHTHALMOLOGY | Facility: CLINIC | Age: 72
End: 2022-05-11
Payer: MEDICARE

## 2022-05-11 DIAGNOSIS — H52.4 HYPEROPIA WITH PRESBYOPIA, BILATERAL: ICD-10-CM

## 2022-05-11 DIAGNOSIS — H25.013 CORTICAL AGE-RELATED CATARACT OF BOTH EYES: ICD-10-CM

## 2022-05-11 DIAGNOSIS — H25.13 NUCLEAR SCLEROSIS, BILATERAL: Primary | ICD-10-CM

## 2022-05-11 DIAGNOSIS — H52.03 HYPEROPIA WITH PRESBYOPIA, BILATERAL: ICD-10-CM

## 2022-05-11 PROCEDURE — 92015 DETERMINE REFRACTIVE STATE: CPT | Mod: S$GLB,,, | Performed by: OPTOMETRIST

## 2022-05-11 PROCEDURE — 1159F MED LIST DOCD IN RCRD: CPT | Mod: CPTII,S$GLB,, | Performed by: OPTOMETRIST

## 2022-05-11 PROCEDURE — 99999 PR PBB SHADOW E&M-EST. PATIENT-LVL II: CPT | Mod: PBBFAC,,, | Performed by: OPTOMETRIST

## 2022-05-11 PROCEDURE — 1159F PR MEDICATION LIST DOCUMENTED IN MEDICAL RECORD: ICD-10-PCS | Mod: CPTII,S$GLB,, | Performed by: OPTOMETRIST

## 2022-05-11 PROCEDURE — 92015 PR REFRACTION: ICD-10-PCS | Mod: S$GLB,,, | Performed by: OPTOMETRIST

## 2022-05-11 PROCEDURE — 92004 COMPRE OPH EXAM NEW PT 1/>: CPT | Mod: S$GLB,,, | Performed by: OPTOMETRIST

## 2022-05-11 PROCEDURE — 99999 PR PBB SHADOW E&M-EST. PATIENT-LVL II: ICD-10-PCS | Mod: PBBFAC,,, | Performed by: OPTOMETRIST

## 2022-05-11 PROCEDURE — 92004 PR EYE EXAM, NEW PATIENT,COMPREHESV: ICD-10-PCS | Mod: S$GLB,,, | Performed by: OPTOMETRIST

## 2022-05-11 PROCEDURE — 1160F RVW MEDS BY RX/DR IN RCRD: CPT | Mod: CPTII,S$GLB,, | Performed by: OPTOMETRIST

## 2022-05-11 PROCEDURE — 1160F PR REVIEW ALL MEDS BY PRESCRIBER/CLIN PHARMACIST DOCUMENTED: ICD-10-PCS | Mod: CPTII,S$GLB,, | Performed by: OPTOMETRIST

## 2022-05-11 NOTE — PROGRESS NOTES
HPI     Annual Exam     Comments: NP              Comments     Vision changes since last eye exam?: blurry vision for both her distance   and near    Any eye pain today: no    Other ocular symptoms: watery    Interested in contact lens fitting today? no                      Last edited by Fifi Arriaga on 5/11/2022 11:00 AM. (History)            Assessment /Plan     For exam results, see Encounter Report.    Nuclear sclerosis, bilateral  Cortical age-related cataract of both eyes  Cataracts not significantly affecting activities of daily living and therefore surgery is not indicated at this time.   Will continue to monitor over the next 12 months. Pt to call or RTC with any significant change in vision prior to next visit.     Hyperopia with presbyopia, bilateral  Eyeglass Final Rx     Eyeglass Final Rx       Sphere Add    Right +2.25 +2.50    Left +1.75 +2.50    Expiration Date: 5/11/2023                  RTC 1 yr for dilated eye exam or PRN if any problems.   Discussed above and answered questions.

## 2022-06-17 ENCOUNTER — TELEPHONE (OUTPATIENT)
Dept: PREADMISSION TESTING | Facility: HOSPITAL | Age: 72
End: 2022-06-17
Payer: MEDICARE

## 2022-06-17 NOTE — TELEPHONE ENCOUNTER
PAT call completed.  Patient educated on procedure instructions.  Medical history discussed and patient informed of arrival time of 8:00 AM on Wednesday, June 22, 2022 at the [facility], and was made aware of the limited-visitor policy, and that  is to remain during the entire visit.  All questions and concerns addressed.  Endoscopy instructions reviewed. Instructed no solid food, only clear liquids, the day before the procedure, then at 6 PM, the day before the procedure, drink the first half of the bowel prep, then at 2 AM, the morning of procedure, drink the second half of the prep, then do not eat or drink anything until after the procedure.  Also instructed to only take her blood pressure medication HCTZ the morning of procedure with just a few small sips of water.  Pre-procedure Covid testing not needed, patient is vaccinated. Patient verbalized understanding of all instructions.

## 2022-12-23 ENCOUNTER — TELEPHONE (OUTPATIENT)
Dept: FAMILY MEDICINE | Facility: CLINIC | Age: 72
End: 2022-12-23
Payer: MEDICARE

## 2022-12-23 DIAGNOSIS — Z12.31 VISIT FOR SCREENING MAMMOGRAM: Primary | ICD-10-CM

## 2022-12-23 DIAGNOSIS — Z78.0 POST-MENOPAUSAL: ICD-10-CM

## 2022-12-23 DIAGNOSIS — Z12.31 ENCOUNTER FOR SCREENING MAMMOGRAM FOR MALIGNANT NEOPLASM OF BREAST: ICD-10-CM

## 2022-12-23 DIAGNOSIS — Z12.31 ENCOUNTER FOR SCREENING MAMMOGRAM FOR BREAST CANCER: ICD-10-CM

## 2022-12-23 DIAGNOSIS — Z13.820 SCREENING FOR OSTEOPOROSIS: ICD-10-CM

## 2022-12-23 NOTE — TELEPHONE ENCOUNTER
----- Message from Darby Little sent at 12/23/2022  9:48 AM CST -----  Pt is requesting a bone density and mammogram referral be sent over to [facility], fax [phone]. Thx jm

## 2022-12-28 ENCOUNTER — PES CALL (OUTPATIENT)
Dept: ADMINISTRATIVE | Facility: CLINIC | Age: 72
End: 2022-12-28
Payer: MEDICARE

## 2023-01-20 ENCOUNTER — PATIENT OUTREACH (OUTPATIENT)
Dept: ADMINISTRATIVE | Facility: HOSPITAL | Age: 73
End: 2023-01-20
Payer: MEDICARE

## 2023-01-20 NOTE — PROGRESS NOTES
Working Overdue Mammo Report.    Pt requested in Dec 2022 to have mammo and dex order faxed to [facility].  Per note orders were faxed 12/23/22.    Attempted to contact pt to see if they have been completed or if they are scheduled.  No answer, unable to leave message.

## 2023-01-22 PROBLEM — R73.03 PREDIABETES: Status: ACTIVE | Noted: 2023-01-22

## 2023-01-22 PROBLEM — B37.31 VAGINAL YEAST INFECTION: Status: RESOLVED | Noted: 2021-04-20 | Resolved: 2023-01-22

## 2023-01-23 ENCOUNTER — LAB VISIT (OUTPATIENT)
Dept: LAB | Facility: HOSPITAL | Age: 73
End: 2023-01-23
Attending: REGISTERED NURSE
Payer: MEDICARE

## 2023-01-23 ENCOUNTER — OFFICE VISIT (OUTPATIENT)
Dept: FAMILY MEDICINE | Facility: CLINIC | Age: 73
End: 2023-01-23
Payer: MEDICARE

## 2023-01-23 VITALS
SYSTOLIC BLOOD PRESSURE: 112 MMHG | HEART RATE: 93 BPM | WEIGHT: 242.63 LBS | TEMPERATURE: 98 F | OXYGEN SATURATION: 95 % | DIASTOLIC BLOOD PRESSURE: 72 MMHG | HEIGHT: 63 IN | BODY MASS INDEX: 42.99 KG/M2

## 2023-01-23 DIAGNOSIS — Z91.89 AT RISK FOR DECREASED BONE DENSITY: ICD-10-CM

## 2023-01-23 DIAGNOSIS — Z12.31 ENCOUNTER FOR SCREENING MAMMOGRAM FOR MALIGNANT NEOPLASM OF BREAST: ICD-10-CM

## 2023-01-23 DIAGNOSIS — R07.9 CHEST PAIN, UNSPECIFIED TYPE: ICD-10-CM

## 2023-01-23 DIAGNOSIS — R60.9 EDEMA, UNSPECIFIED TYPE: ICD-10-CM

## 2023-01-23 DIAGNOSIS — R73.03 PREDIABETES: ICD-10-CM

## 2023-01-23 DIAGNOSIS — Z76.0 MEDICATION REFILL: ICD-10-CM

## 2023-01-23 DIAGNOSIS — E66.01 MORBID OBESITY WITH BMI OF 40.0-44.9, ADULT: ICD-10-CM

## 2023-01-23 DIAGNOSIS — Z00.00 PREVENTATIVE HEALTH CARE: Primary | ICD-10-CM

## 2023-01-23 DIAGNOSIS — F17.210 CIGARETTE NICOTINE DEPENDENCE WITHOUT COMPLICATION: ICD-10-CM

## 2023-01-23 DIAGNOSIS — B00.9 HSV-2 (HERPES SIMPLEX VIRUS 2) INFECTION: ICD-10-CM

## 2023-01-23 LAB
ALBUMIN SERPL BCP-MCNC: 3.6 G/DL (ref 3.5–5.2)
ALP SERPL-CCNC: 59 U/L (ref 55–135)
ALT SERPL W/O P-5'-P-CCNC: 20 U/L (ref 10–44)
ANION GAP SERPL CALC-SCNC: 11 MMOL/L (ref 8–16)
AST SERPL-CCNC: 16 U/L (ref 10–40)
BASOPHILS # BLD AUTO: 0.03 K/UL (ref 0–0.2)
BASOPHILS NFR BLD: 0.4 % (ref 0–1.9)
BILIRUB SERPL-MCNC: 0.4 MG/DL (ref 0.1–1)
BUN SERPL-MCNC: 12 MG/DL (ref 8–23)
CALCIUM SERPL-MCNC: 10 MG/DL (ref 8.7–10.5)
CHLORIDE SERPL-SCNC: 101 MMOL/L (ref 95–110)
CHOLEST SERPL-MCNC: 185 MG/DL (ref 120–199)
CHOLEST/HDLC SERPL: 4.1 {RATIO} (ref 2–5)
CO2 SERPL-SCNC: 26 MMOL/L (ref 23–29)
CREAT SERPL-MCNC: 0.9 MG/DL (ref 0.5–1.4)
DIFFERENTIAL METHOD: ABNORMAL
EOSINOPHIL # BLD AUTO: 0.6 K/UL (ref 0–0.5)
EOSINOPHIL NFR BLD: 7.7 % (ref 0–8)
ERYTHROCYTE [DISTWIDTH] IN BLOOD BY AUTOMATED COUNT: 14.3 % (ref 11.5–14.5)
EST. GFR  (NO RACE VARIABLE): >60 ML/MIN/1.73 M^2
ESTIMATED AVG GLUCOSE: 131 MG/DL (ref 68–131)
GLUCOSE SERPL-MCNC: 84 MG/DL (ref 70–110)
HBA1C MFR BLD: 6.2 % (ref 4–5.6)
HCT VFR BLD AUTO: 42.9 % (ref 37–48.5)
HDLC SERPL-MCNC: 45 MG/DL (ref 40–75)
HDLC SERPL: 24.3 % (ref 20–50)
HGB BLD-MCNC: 13.7 G/DL (ref 12–16)
IMM GRANULOCYTES # BLD AUTO: 0.03 K/UL (ref 0–0.04)
IMM GRANULOCYTES NFR BLD AUTO: 0.4 % (ref 0–0.5)
LDLC SERPL CALC-MCNC: 126.2 MG/DL (ref 63–159)
LYMPHOCYTES # BLD AUTO: 2.9 K/UL (ref 1–4.8)
LYMPHOCYTES NFR BLD: 35.9 % (ref 18–48)
MCH RBC QN AUTO: 29.1 PG (ref 27–31)
MCHC RBC AUTO-ENTMCNC: 31.9 G/DL (ref 32–36)
MCV RBC AUTO: 91 FL (ref 82–98)
MONOCYTES # BLD AUTO: 0.5 K/UL (ref 0.3–1)
MONOCYTES NFR BLD: 5.9 % (ref 4–15)
NEUTROPHILS # BLD AUTO: 4.1 K/UL (ref 1.8–7.7)
NEUTROPHILS NFR BLD: 49.7 % (ref 38–73)
NONHDLC SERPL-MCNC: 140 MG/DL
NRBC BLD-RTO: 0 /100 WBC
PLATELET # BLD AUTO: 349 K/UL (ref 150–450)
PMV BLD AUTO: 10.9 FL (ref 9.2–12.9)
POTASSIUM SERPL-SCNC: 3.6 MMOL/L (ref 3.5–5.1)
PROT SERPL-MCNC: 8.3 G/DL (ref 6–8.4)
RBC # BLD AUTO: 4.71 M/UL (ref 4–5.4)
SODIUM SERPL-SCNC: 138 MMOL/L (ref 136–145)
TRIGL SERPL-MCNC: 69 MG/DL (ref 30–150)
TSH SERPL DL<=0.005 MIU/L-ACNC: 1.76 UIU/ML (ref 0.4–4)
WBC # BLD AUTO: 8.19 K/UL (ref 3.9–12.7)

## 2023-01-23 PROCEDURE — 80061 LIPID PANEL: CPT | Mod: HCNC | Performed by: REGISTERED NURSE

## 2023-01-23 PROCEDURE — 99999 PR PBB SHADOW E&M-EST. PATIENT-LVL V: ICD-10-PCS | Mod: PBBFAC,HCNC,, | Performed by: REGISTERED NURSE

## 2023-01-23 PROCEDURE — 3008F BODY MASS INDEX DOCD: CPT | Mod: HCNC,CPTII,S$GLB, | Performed by: REGISTERED NURSE

## 2023-01-23 PROCEDURE — 1126F PR PAIN SEVERITY QUANTIFIED, NO PAIN PRESENT: ICD-10-PCS | Mod: HCNC,CPTII,S$GLB, | Performed by: REGISTERED NURSE

## 2023-01-23 PROCEDURE — 1159F PR MEDICATION LIST DOCUMENTED IN MEDICAL RECORD: ICD-10-PCS | Mod: HCNC,CPTII,S$GLB, | Performed by: REGISTERED NURSE

## 2023-01-23 PROCEDURE — 3288F FALL RISK ASSESSMENT DOCD: CPT | Mod: HCNC,CPTII,S$GLB, | Performed by: REGISTERED NURSE

## 2023-01-23 PROCEDURE — 80053 COMPREHEN METABOLIC PANEL: CPT | Mod: HCNC | Performed by: REGISTERED NURSE

## 2023-01-23 PROCEDURE — 36415 COLL VENOUS BLD VENIPUNCTURE: CPT | Mod: HCNC,PO | Performed by: REGISTERED NURSE

## 2023-01-23 PROCEDURE — 85025 COMPLETE CBC W/AUTO DIFF WBC: CPT | Mod: HCNC | Performed by: REGISTERED NURSE

## 2023-01-23 PROCEDURE — 3078F DIAST BP <80 MM HG: CPT | Mod: HCNC,CPTII,S$GLB, | Performed by: REGISTERED NURSE

## 2023-01-23 PROCEDURE — 83036 HEMOGLOBIN GLYCOSYLATED A1C: CPT | Mod: HCNC | Performed by: REGISTERED NURSE

## 2023-01-23 PROCEDURE — 84443 ASSAY THYROID STIM HORMONE: CPT | Mod: HCNC | Performed by: REGISTERED NURSE

## 2023-01-23 PROCEDURE — 1101F PR PT FALLS ASSESS DOC 0-1 FALLS W/OUT INJ PAST YR: ICD-10-PCS | Mod: HCNC,CPTII,S$GLB, | Performed by: REGISTERED NURSE

## 2023-01-23 PROCEDURE — 1101F PT FALLS ASSESS-DOCD LE1/YR: CPT | Mod: HCNC,CPTII,S$GLB, | Performed by: REGISTERED NURSE

## 2023-01-23 PROCEDURE — 99214 OFFICE O/P EST MOD 30 MIN: CPT | Mod: HCNC,S$GLB,, | Performed by: REGISTERED NURSE

## 2023-01-23 PROCEDURE — 1159F MED LIST DOCD IN RCRD: CPT | Mod: HCNC,CPTII,S$GLB, | Performed by: REGISTERED NURSE

## 2023-01-23 PROCEDURE — 3074F SYST BP LT 130 MM HG: CPT | Mod: HCNC,CPTII,S$GLB, | Performed by: REGISTERED NURSE

## 2023-01-23 PROCEDURE — 1126F AMNT PAIN NOTED NONE PRSNT: CPT | Mod: HCNC,CPTII,S$GLB, | Performed by: REGISTERED NURSE

## 2023-01-23 PROCEDURE — 3074F PR MOST RECENT SYSTOLIC BLOOD PRESSURE < 130 MM HG: ICD-10-PCS | Mod: HCNC,CPTII,S$GLB, | Performed by: REGISTERED NURSE

## 2023-01-23 PROCEDURE — 3288F PR FALLS RISK ASSESSMENT DOCUMENTED: ICD-10-PCS | Mod: HCNC,CPTII,S$GLB, | Performed by: REGISTERED NURSE

## 2023-01-23 PROCEDURE — 99999 PR PBB SHADOW E&M-EST. PATIENT-LVL V: CPT | Mod: PBBFAC,HCNC,, | Performed by: REGISTERED NURSE

## 2023-01-23 PROCEDURE — 3078F PR MOST RECENT DIASTOLIC BLOOD PRESSURE < 80 MM HG: ICD-10-PCS | Mod: HCNC,CPTII,S$GLB, | Performed by: REGISTERED NURSE

## 2023-01-23 PROCEDURE — 3008F PR BODY MASS INDEX (BMI) DOCUMENTED: ICD-10-PCS | Mod: HCNC,CPTII,S$GLB, | Performed by: REGISTERED NURSE

## 2023-01-23 PROCEDURE — 99214 PR OFFICE/OUTPT VISIT, EST, LEVL IV, 30-39 MIN: ICD-10-PCS | Mod: HCNC,S$GLB,, | Performed by: REGISTERED NURSE

## 2023-01-23 RX ORDER — HYDROCHLOROTHIAZIDE 12.5 MG/1
12.5 TABLET ORAL DAILY PRN
Qty: 90 TABLET | Refills: 1 | Status: SHIPPED | OUTPATIENT
Start: 2023-01-23 | End: 2023-02-27

## 2023-01-23 NOTE — PROGRESS NOTES
"Subjective:      Triny Mandel is a 72 y.o. female, to the office today for:   ANNUAL WELLNESS    HPI:    Triny Mandel has fasted to have bloodwork done today.  I have reviewed the patient's medical history in detail and updated the computerized patient record.    Health Maintenance Due   Topic Date Due    TETANUS VACCINE  Never done --- pharmacy    DEXA Scan  Never done    LDCT Lung Screen  Never done    Shingles Vaccine (1 of 2) Never done --- pharmacy    Hemoglobin A1c (Prediabetes)  02/10/2021    Pneumococcal Vaccines (Age 65+) (2 - PPSV23 if available, else PCV20) 02/10/2021    Mammogram  08/20/2021    COVID-19 Vaccine (4 - Booster for Pfizer series) 01/25/2022 --- pharmacy    Influenza Vaccine (1) Never done       Review of Systems   Constitutional:  Positive for unexpected weight change (GAIN). Negative for activity change, appetite change, chills, diaphoresis, fatigue and fever.        Wt Readings from Last 3 Encounters:  01/23/23 0813 : 110.1 kg (242 lb 9.9 oz)  03/03/22 1522 : 106.1 kg (234 lb)  01/24/22 1640 : 106.1 kg (234 lb 0.3 oz)  Weight gain noted.  No exercise.  Diet --- "I eat whatever".  Minimal water intake, moderate salt intake daily.   HENT: Negative.     Eyes:  Negative for discharge and visual disturbance.   Respiratory:  Negative for cough, shortness of breath and wheezing.    Cardiovascular:  Positive for chest pain and leg swelling (occ, on fluid pill). Negative for palpitations.        Reports intermittent CP, active smoker.  Occurs at rest and with activity.  Unable to describe her pain.  States possible GERD issues.   Gastrointestinal: Negative.    Genitourinary: Negative.    Musculoskeletal:  Positive for arthralgias.   Integumentary:  Negative for rash and mole/lesion.   Neurological:  Negative for vertigo, seizures, syncope, numbness and headaches.   Hematological: Negative.    Psychiatric/Behavioral:  Negative for depression and sleep disturbance. The patient is not " "nervous/anxious.    Breast: negative.        Review of patient's allergies indicates:  No Known Allergies      Patient Active Problem List   Diagnosis    HSV-2 (herpes simplex virus 2) infection    Edema    Severe obesity (BMI 35.0-35.9 with comorbidity)    Cigarette nicotine dependence    Prediabetes       Current Outpatient Medications:     hydroCHLOROthiazide (HYDRODIURIL) 12.5 MG Tab, TAKE 1 TABLET (12.5 MG TOTAL) BY MOUTH ONCE DAILY., Disp: 90 tablet, Rfl: 3    valACYclovir (VALTREX) 1000 MG tablet, Take 1 tablet (1,000 mg total) by mouth every 12 (twelve) hours. for 10 days, Disp: 20 tablet, Rfl: 0      Past Medical History:   Diagnosis Date    Arthritis     Herpes simplex        Past Surgical History:   Procedure Laterality Date    COLONOSCOPY N/A 7/30/2020    Procedure: COLONOSCOPY;  Surgeon: Molly Emerson MD;  Location: [facility] ENDO;  Service: Endoscopy;  Laterality: N/A;    COLONOSCOPY N/A 6/22/2022    Procedure: COLONOSCOPY;  Surgeon: Flower Garcia MD;  Location: [facility] ENDO;  Service: Endoscopy;  Laterality: N/A;    HYSTERECTOMY      unclear - doesn't think it was due to cancer       Family History   Problem Relation Age of Onset    Cancer Mother         breast cancer    No Known Problems Sister     No Known Problems Brother     No Known Problems Daughter     No Known Problems Son     No Known Problems Brother     No Known Problems Son        Social History     Tobacco Use    Smoking status: Every Day     Packs/day: 1.00     Years: 45.00     Pack years: 45.00     Types: Cigarettes    Smokeless tobacco: Never   Substance Use Topics    Alcohol use: Yes     Comment: every hoilday     Drug use: Never         Objective:     Vitals:    01/23/23 0813   BP: 112/72   Pulse: 93   Temp: 98 °F (36.7 °C)   SpO2: 95%   Weight: 110.1 kg (242 lb 9.9 oz)   Height: 5' 3" (1.6 m)       Body mass index is 42.98 kg/m².      Physical Exam  Constitutional:       General: She is not in acute distress.     Appearance: " She is well-developed. She is not diaphoretic.   HENT:      Head: Normocephalic and atraumatic.      Right Ear: Tympanic membrane, ear canal and external ear normal. There is no impacted cerumen.      Left Ear: Tympanic membrane, ear canal and external ear normal. There is no impacted cerumen.      Nose: Nose normal. No nasal deformity, mucosal edema, congestion or rhinorrhea.      Mouth/Throat:      Mouth: Mucous membranes are moist. Mucous membranes are not dry and not cyanotic. No oral lesions.      Dentition: Normal dentition.      Pharynx: Oropharynx is clear. Uvula midline. No oropharyngeal exudate, posterior oropharyngeal erythema or uvula swelling.      Tonsils: No tonsillar abscesses.   Eyes:      General: Lids are normal. Lids are everted, no foreign bodies appreciated. No scleral icterus.        Right eye: No discharge.         Left eye: No discharge.      Conjunctiva/sclera: Conjunctivae normal.      Right eye: Right conjunctiva is not injected. No exudate.     Left eye: Left conjunctiva is not injected. No exudate.     Pupils: Pupils are equal, round, and reactive to light.   Neck:      Thyroid: No thyroid mass or thyromegaly.      Vascular: No carotid bruit or JVD.      Trachea: No tracheal deviation.   Cardiovascular:      Rate and Rhythm: Normal rate and regular rhythm.      Pulses: Normal pulses.      Heart sounds: Normal heart sounds. No murmur heard.    No friction rub. No gallop.   Pulmonary:      Effort: Pulmonary effort is normal. No respiratory distress.      Breath sounds: Normal breath sounds. No stridor. No wheezing.   Chest:      Chest wall: No tenderness.   Abdominal:      General: Bowel sounds are normal. There is no distension.      Palpations: Abdomen is soft. There is no mass.      Tenderness: There is no abdominal tenderness. There is no guarding or rebound.   Genitourinary:     Comments: Deferred///hysterectomy  Musculoskeletal:         General: No swelling, tenderness or deformity.  Normal range of motion.      Cervical back: Normal range of motion and neck supple. No edema or erythema. Normal range of motion.   Lymphadenopathy:      Cervical: No cervical adenopathy.   Skin:     General: Skin is warm and dry.      Capillary Refill: Capillary refill takes less than 2 seconds.      Coloration: Skin is not pale.      Findings: No bruising, erythema or rash.   Neurological:      Mental Status: She is alert and oriented to person, place, and time.      Sensory: No sensory deficit.      Motor: No weakness, tremor, atrophy or abnormal muscle tone.      Coordination: Coordination normal.      Gait: Gait normal.      Deep Tendon Reflexes: Reflexes are normal and symmetric.   Psychiatric:         Mood and Affect: Mood normal.         Speech: Speech normal.         Behavior: Behavior normal.         Thought Content: Thought content normal.         Cognition and Memory: Memory is not impaired.         Judgment: Judgment normal.       LABS REVIEWED:    Lab Results   Component Value Date    WBC 7.86 07/09/2020    RBC 4.53 07/09/2020    HGB 12.5 07/09/2020    HCT 41.8 07/09/2020    MCV 92 07/09/2020    MCH 27.6 07/09/2020    MCHC 29.9 (L) 07/09/2020    RDW 15.5 (H) 07/09/2020     07/09/2020    MPV 10.7 07/09/2020    GRAN 4.0 07/09/2020    GRAN 51.0 07/09/2020    LYMPH 3.0 07/09/2020    LYMPH 38.7 07/09/2020    MONO 0.5 07/09/2020    MONO 6.6 07/09/2020    EOS 0.2 07/09/2020    BASO 0.04 07/09/2020    EOSINOPHIL 2.7 07/09/2020    BASOPHIL 0.5 07/09/2020       Sodium   Date Value Ref Range Status   07/09/2020 140 136 - 145 mmol/L Final     Potassium   Date Value Ref Range Status   07/09/2020 3.8 3.5 - 5.1 mmol/L Final     Chloride   Date Value Ref Range Status   07/09/2020 105 95 - 110 mmol/L Final     CO2   Date Value Ref Range Status   07/09/2020 28 23 - 29 mmol/L Final     Glucose   Date Value Ref Range Status   07/09/2020 82 70 - 110 mg/dL Final     BUN   Date Value Ref Range Status   07/09/2020 11 8  - 23 mg/dL Final     Creatinine   Date Value Ref Range Status   07/09/2020 0.9 0.5 - 1.4 mg/dL Final     Calcium   Date Value Ref Range Status   07/09/2020 9.3 8.7 - 10.5 mg/dL Final     Total Protein   Date Value Ref Range Status   07/09/2020 7.5 6.0 - 8.4 g/dL Final     Albumin   Date Value Ref Range Status   07/09/2020 3.4 (L) 3.5 - 5.2 g/dL Final     Total Bilirubin   Date Value Ref Range Status   07/09/2020 0.4 0.1 - 1.0 mg/dL Final     Comment:     For infants and newborns, interpretation of results should be based  on gestational age, weight and in agreement with clinical  observations.  Premature Infant recommended reference ranges:  Up to 24 hours.............<8.0 mg/dL  Up to 48 hours............<12.0 mg/dL  3-5 days..................<15.0 mg/dL  6-29 days.................<15.0 mg/dL       Alkaline Phosphatase   Date Value Ref Range Status   07/09/2020 66 55 - 135 U/L Final     AST   Date Value Ref Range Status   07/09/2020 17 10 - 40 U/L Final     ALT   Date Value Ref Range Status   07/09/2020 19 10 - 44 U/L Final     Anion Gap   Date Value Ref Range Status   07/09/2020 7 (L) 8 - 16 mmol/L Final       eGFR if    Date Value Ref Range Status   07/09/2020 >60.0 >60 mL/min/1.73 m^2 Final          Lab Results   Component Value Date    CHOL 189 02/10/2020     Lab Results   Component Value Date    TRIG 83 02/10/2020     Lab Results   Component Value Date    HDL 61 02/10/2020     Lab Results   Component Value Date    LDLCALC 111.4 02/10/2020       No results found for: TSH, C2DKZXT, W7CKPFF, THYROIDAB, FREET4    Lab Results   Component Value Date    HGBA1C 6.2 (H) 02/10/2020         Lab Results   Component Value Date    HEPCAB Negative 02/10/2020         Diagnosis/Assessment:     1. Preventative health care    2. Chest pain, unspecified type  - Ambulatory referral/consult to Cardiology; Future    3. Prediabetes  - CBC Auto Differential; Future  - Comprehensive Metabolic Panel; Future  - TSH;  Future  - Lipid Panel; Future  - Hemoglobin A1C; Future    4. Edema, unspecified type  - hydroCHLOROthiazide (HYDRODIURIL) 12.5 MG Tab; Take 1 tablet (12.5 mg total) by mouth daily as needed (swelling).  Dispense: 90 tablet; Refill: 1  - Comprehensive Metabolic Panel; Future    5. HSV-2 (herpes simplex virus 2) infection ---- Valtrex prn for outbreaks    6. Cigarette nicotine dependence without complication  - Ambulatory referral/consult to Cardiology; Future  - Ambulatory referral/consult to Smoking Cessation Program; Future  - CT Chest Lung Screening Low Dose; Future  - DXA Bone Density Spine And Hip; Future    7. Morbid obesity with BMI of 40.0-44.9, adult  - Ambulatory referral/consult to Cardiology; Future  - CBC Auto Differential; Future  - Comprehensive Metabolic Panel; Future  - TSH; Future  - Lipid Panel; Future  - Hemoglobin A1C; Future    8. Encounter for screening mammogram for malignant neoplasm of breast  - Mammo Digital Screening Bilat w/ Jatinder; Future    9. At risk for decreased bone density  - DXA Bone Density Spine And Hip; Future    10. Medication refill  - hydroCHLOROthiazide (HYDRODIURIL) 12.5 MG Tab; Take 1 tablet (12.5 mg total) by mouth daily as needed (swelling).  Dispense: 90 tablet; Refill: 1       Plan:     Healthy dietary and lifestyle changes discussed.  Refuses vaccines today.    Follow-Up:     Pending lab.  RTC as directed or prn.        ANJEL Casas  [facility]       Patient Care Team:  Natalya Peguero MD as PCP - General (Family Medicine)      30 minutes of total time spent on the encounter, which includes face to face time and non-face to face time preparing to see the patient.  This included obtaining and/or reviewing separately obtained history, and documenting clinical information in the electronic or other health record.   Also includes independent interpretation of results (not separately reported) and communicating results to the  patient/family/caregiver, with care coordination (not separately reported).

## 2023-01-30 ENCOUNTER — TELEPHONE (OUTPATIENT)
Dept: FAMILY MEDICINE | Facility: CLINIC | Age: 73
End: 2023-01-30
Payer: MEDICARE

## 2023-02-01 ENCOUNTER — HOSPITAL ENCOUNTER (OUTPATIENT)
Dept: RADIOLOGY | Facility: HOSPITAL | Age: 73
Discharge: HOME | End: 2023-02-01
Attending: REGISTERED NURSE
Payer: MEDICARE

## 2023-02-01 DIAGNOSIS — F17.210 CIGARETTE NICOTINE DEPENDENCE WITHOUT COMPLICATION: ICD-10-CM

## 2023-02-01 DIAGNOSIS — R91.1 LUNG NODULE: ICD-10-CM

## 2023-02-01 DIAGNOSIS — J43.9 PULMONARY EMPHYSEMA, UNSPECIFIED EMPHYSEMA TYPE: Primary | ICD-10-CM

## 2023-02-01 DIAGNOSIS — F17.208 NICOTINE DEPENDENCE WITH OTHER NICOTINE-INDUCED DISORDER, UNSPECIFIED NICOTINE PRODUCT TYPE: ICD-10-CM

## 2023-02-01 PROCEDURE — 71271 CT THORAX LUNG CANCER SCR C-: CPT | Mod: TC,HCNC

## 2023-02-01 PROCEDURE — 71271 CT THORAX LUNG CANCER SCR C-: CPT | Mod: 26,HCNC,, | Performed by: RADIOLOGY

## 2023-02-01 PROCEDURE — 71271 CT CHEST LUNG SCREENING LOW DOSE: ICD-10-PCS | Mod: 26,HCNC,, | Performed by: RADIOLOGY

## 2023-02-03 ENCOUNTER — OFFICE VISIT (OUTPATIENT)
Dept: PULMONOLOGY | Facility: CLINIC | Age: 73
End: 2023-02-03
Payer: MEDICARE

## 2023-02-03 VITALS
RESPIRATION RATE: 20 BRPM | HEIGHT: 63 IN | DIASTOLIC BLOOD PRESSURE: 82 MMHG | OXYGEN SATURATION: 96 % | SYSTOLIC BLOOD PRESSURE: 120 MMHG | BODY MASS INDEX: 42.77 KG/M2 | HEART RATE: 100 BPM | WEIGHT: 241.38 LBS

## 2023-02-03 DIAGNOSIS — F17.208 NICOTINE DEPENDENCE WITH OTHER NICOTINE-INDUCED DISORDER, UNSPECIFIED NICOTINE PRODUCT TYPE: ICD-10-CM

## 2023-02-03 DIAGNOSIS — J43.9 PULMONARY EMPHYSEMA, UNSPECIFIED EMPHYSEMA TYPE: ICD-10-CM

## 2023-02-03 DIAGNOSIS — R91.1 LUNG NODULE: ICD-10-CM

## 2023-02-03 DIAGNOSIS — R91.1 SOLITARY PULMONARY NODULE: Primary | ICD-10-CM

## 2023-02-03 PROCEDURE — 3288F PR FALLS RISK ASSESSMENT DOCUMENTED: ICD-10-PCS | Mod: HCNC,CPTII,S$GLB, | Performed by: INTERNAL MEDICINE

## 2023-02-03 PROCEDURE — 1101F PT FALLS ASSESS-DOCD LE1/YR: CPT | Mod: HCNC,CPTII,S$GLB, | Performed by: INTERNAL MEDICINE

## 2023-02-03 PROCEDURE — 3079F PR MOST RECENT DIASTOLIC BLOOD PRESSURE 80-89 MM HG: ICD-10-PCS | Mod: HCNC,CPTII,S$GLB, | Performed by: INTERNAL MEDICINE

## 2023-02-03 PROCEDURE — 99999 PR PBB SHADOW E&M-EST. PATIENT-LVL III: ICD-10-PCS | Mod: PBBFAC,HCNC,, | Performed by: INTERNAL MEDICINE

## 2023-02-03 PROCEDURE — 3008F BODY MASS INDEX DOCD: CPT | Mod: HCNC,CPTII,S$GLB, | Performed by: INTERNAL MEDICINE

## 2023-02-03 PROCEDURE — 1160F PR REVIEW ALL MEDS BY PRESCRIBER/CLIN PHARMACIST DOCUMENTED: ICD-10-PCS | Mod: HCNC,CPTII,S$GLB, | Performed by: INTERNAL MEDICINE

## 2023-02-03 PROCEDURE — 3074F PR MOST RECENT SYSTOLIC BLOOD PRESSURE < 130 MM HG: ICD-10-PCS | Mod: HCNC,CPTII,S$GLB, | Performed by: INTERNAL MEDICINE

## 2023-02-03 PROCEDURE — 3044F PR MOST RECENT HEMOGLOBIN A1C LEVEL <7.0%: ICD-10-PCS | Mod: HCNC,CPTII,S$GLB, | Performed by: INTERNAL MEDICINE

## 2023-02-03 PROCEDURE — 3008F PR BODY MASS INDEX (BMI) DOCUMENTED: ICD-10-PCS | Mod: HCNC,CPTII,S$GLB, | Performed by: INTERNAL MEDICINE

## 2023-02-03 PROCEDURE — 3044F HG A1C LEVEL LT 7.0%: CPT | Mod: HCNC,CPTII,S$GLB, | Performed by: INTERNAL MEDICINE

## 2023-02-03 PROCEDURE — 1159F PR MEDICATION LIST DOCUMENTED IN MEDICAL RECORD: ICD-10-PCS | Mod: HCNC,CPTII,S$GLB, | Performed by: INTERNAL MEDICINE

## 2023-02-03 PROCEDURE — 99999 PR PBB SHADOW E&M-EST. PATIENT-LVL III: CPT | Mod: PBBFAC,HCNC,, | Performed by: INTERNAL MEDICINE

## 2023-02-03 PROCEDURE — 3288F FALL RISK ASSESSMENT DOCD: CPT | Mod: HCNC,CPTII,S$GLB, | Performed by: INTERNAL MEDICINE

## 2023-02-03 PROCEDURE — 1159F MED LIST DOCD IN RCRD: CPT | Mod: HCNC,CPTII,S$GLB, | Performed by: INTERNAL MEDICINE

## 2023-02-03 PROCEDURE — 99204 PR OFFICE/OUTPT VISIT, NEW, LEVL IV, 45-59 MIN: ICD-10-PCS | Mod: HCNC,S$GLB,, | Performed by: INTERNAL MEDICINE

## 2023-02-03 PROCEDURE — 1101F PR PT FALLS ASSESS DOC 0-1 FALLS W/OUT INJ PAST YR: ICD-10-PCS | Mod: HCNC,CPTII,S$GLB, | Performed by: INTERNAL MEDICINE

## 2023-02-03 PROCEDURE — 99204 OFFICE O/P NEW MOD 45 MIN: CPT | Mod: HCNC,S$GLB,, | Performed by: INTERNAL MEDICINE

## 2023-02-03 PROCEDURE — 1160F RVW MEDS BY RX/DR IN RCRD: CPT | Mod: HCNC,CPTII,S$GLB, | Performed by: INTERNAL MEDICINE

## 2023-02-03 PROCEDURE — 3074F SYST BP LT 130 MM HG: CPT | Mod: HCNC,CPTII,S$GLB, | Performed by: INTERNAL MEDICINE

## 2023-02-03 PROCEDURE — 3079F DIAST BP 80-89 MM HG: CPT | Mod: HCNC,CPTII,S$GLB, | Performed by: INTERNAL MEDICINE

## 2023-02-03 NOTE — PROGRESS NOTES
Subjective:       Patient ID: Triny Mandel is a 72 y.o. female.    Chief Complaint: Emphysema and Pulmonary Nodules    Pulmonary Nodules      Patient is a 72-year-old female with 45 pack year smoking history, current smoker also with a history of hypertension, obesity who recently had a screening low-dose CT initial study ordered by her primary care physician.  This showed some emphysema as well as a 5 mm right upper lobe nodule.  She is here today for that reason.  She has no complaints of cough, wheezing, chest pain or dyspnea on exertion.  She is interested in smoking cessation and that referral has already been made.  Otherwise she has no complaints.  No history of pulmonary problems according to the patient.    Past Medical History:   Diagnosis Date    Arthritis     Herpes simplex      Past Surgical History:   Procedure Laterality Date    COLONOSCOPY N/A 7/30/2020    Procedure: COLONOSCOPY;  Surgeon: Molly Emerson MD;  Location: [facility];  Service: Endoscopy;  Laterality: N/A;    COLONOSCOPY N/A 6/22/2022    Procedure: COLONOSCOPY;  Surgeon: Flower Garcia MD;  Location: [facility];  Service: Endoscopy;  Laterality: N/A;    HYSTERECTOMY      unclear - doesn't think it was due to cancer     Social History     Tobacco Use    Smoking status: Every Day     Packs/day: 1.00     Years: 45.00     Pack years: 45.00     Types: Cigarettes    Smokeless tobacco: Never   Substance Use Topics    Alcohol use: Yes     Comment: every hoilday     Drug use: Never     Family History   Problem Relation Age of Onset    Cancer Mother         breast cancer    No Known Problems Sister     No Known Problems Brother     No Known Problems Daughter     No Known Problems Son     No Known Problems Brother     No Known Problems Son        Review of Systems  as per history of present illness otherwise negative    Objective:      Physical Exam   Constitutional: She is oriented to person, place, and time. She appears  well-nourished. No distress.   HENT:   Head: Normocephalic.   Neck: No JVD present.   Cardiovascular: Normal rate and regular rhythm.   No murmur heard.  Pulmonary/Chest: Normal expansion and breath sounds normal. She has no wheezes.   Abdominal: Soft. There is no hepatosplenomegaly.   Musculoskeletal:         General: No edema.      Cervical back: Neck supple.   Neurological: She is alert and oriented to person, place, and time.   Psychiatric: She has a normal mood and affect.   Nursing note and vitals reviewed.        Assessment:       1. Solitary pulmonary nodule    2. Pulmonary emphysema, unspecified emphysema type    3. Lung nodule    4. Nicotine dependence with other nicotine-induced disorder, unspecified nicotine product type          Orders Placed This Encounter   Procedures    CT Chest Without Contrast     Standing Status:   Future     Standing Expiration Date:   2/3/2024     Order Specific Question:   May the Radiologist modify the order per protocol to meet the clinical needs of the patient?     Answer:   Yes     I personally reviewed CT chest images from January 2023.  I agree with the radiologist's interpretation as below    Impression:     5 mm pulmonary nodule in the right upper lobe.  Emphysematous changes.    Plan:       Likely benign 5 mm right upper lobe nodule.  Given patient's risk and smoking history we will recommend repeat CT in 6 months.  Her emphysema is fairly mild.  Currently asymptomatic.  We discussed smoking cessation and she is interested.  That referral has already been placed.  I discussed all of the above with the patient in detail and she voiced understanding and agreement with the plan.  I will see her back in 6 months with a CT, sooner if needed

## 2023-02-15 DIAGNOSIS — Z00.00 ROUTINE HEALTH MAINTENANCE: Primary | ICD-10-CM

## 2023-02-15 DIAGNOSIS — Z76.89 ENCOUNTER TO ESTABLISH CARE: ICD-10-CM

## 2023-02-24 DIAGNOSIS — Z76.89 ENCOUNTER TO ESTABLISH CARE: Primary | ICD-10-CM

## 2023-02-27 ENCOUNTER — HOSPITAL ENCOUNTER (OUTPATIENT)
Dept: CARDIOLOGY | Facility: HOSPITAL | Age: 73
Discharge: HOME | End: 2023-02-27
Attending: INTERNAL MEDICINE
Payer: MEDICARE

## 2023-02-27 ENCOUNTER — OFFICE VISIT (OUTPATIENT)
Dept: CARDIOLOGY | Facility: CLINIC | Age: 73
End: 2023-02-27
Payer: MEDICARE

## 2023-02-27 VITALS
SYSTOLIC BLOOD PRESSURE: 130 MMHG | WEIGHT: 242.5 LBS | HEART RATE: 87 BPM | BODY MASS INDEX: 42.97 KG/M2 | OXYGEN SATURATION: 95 % | HEIGHT: 63 IN | DIASTOLIC BLOOD PRESSURE: 70 MMHG

## 2023-02-27 DIAGNOSIS — R60.9 EDEMA, UNSPECIFIED TYPE: ICD-10-CM

## 2023-02-27 DIAGNOSIS — Z76.89 ENCOUNTER TO ESTABLISH CARE: ICD-10-CM

## 2023-02-27 DIAGNOSIS — Z00.00 ROUTINE HEALTH MAINTENANCE: ICD-10-CM

## 2023-02-27 DIAGNOSIS — R07.9 CHEST PAIN, UNSPECIFIED TYPE: ICD-10-CM

## 2023-02-27 DIAGNOSIS — Z76.0 MEDICATION REFILL: ICD-10-CM

## 2023-02-27 DIAGNOSIS — E78.00 HYPERCHOLESTEROLEMIA: ICD-10-CM

## 2023-02-27 DIAGNOSIS — R60.0 LOCALIZED EDEMA: ICD-10-CM

## 2023-02-27 DIAGNOSIS — I25.118 CORONARY ARTERY DISEASE OF NATIVE ARTERY OF NATIVE HEART WITH STABLE ANGINA PECTORIS: ICD-10-CM

## 2023-02-27 DIAGNOSIS — E66.01 MORBID OBESITY WITH BMI OF 40.0-44.9, ADULT: ICD-10-CM

## 2023-02-27 DIAGNOSIS — F17.210 CIGARETTE NICOTINE DEPENDENCE WITHOUT COMPLICATION: Primary | ICD-10-CM

## 2023-02-27 PROCEDURE — 93010 EKG 12-LEAD: ICD-10-PCS | Mod: ,,, | Performed by: INTERNAL MEDICINE

## 2023-02-27 PROCEDURE — 3075F PR MOST RECENT SYSTOLIC BLOOD PRESS GE 130-139MM HG: ICD-10-PCS | Mod: CPTII,S$GLB,, | Performed by: INTERNAL MEDICINE

## 2023-02-27 PROCEDURE — 93010 ELECTROCARDIOGRAM REPORT: CPT | Mod: ,,, | Performed by: INTERNAL MEDICINE

## 2023-02-27 PROCEDURE — 99214 OFFICE O/P EST MOD 30 MIN: CPT | Mod: S$GLB,,, | Performed by: INTERNAL MEDICINE

## 2023-02-27 PROCEDURE — 99999 PR PBB SHADOW E&M-EST. PATIENT-LVL IV: ICD-10-PCS | Mod: PBBFAC,HCNC,, | Performed by: INTERNAL MEDICINE

## 2023-02-27 PROCEDURE — 1101F PR PT FALLS ASSESS DOC 0-1 FALLS W/OUT INJ PAST YR: ICD-10-PCS | Mod: CPTII,S$GLB,, | Performed by: INTERNAL MEDICINE

## 2023-02-27 PROCEDURE — 3044F HG A1C LEVEL LT 7.0%: CPT | Mod: CPTII,S$GLB,, | Performed by: INTERNAL MEDICINE

## 2023-02-27 PROCEDURE — 93005 ELECTROCARDIOGRAM TRACING: CPT

## 2023-02-27 PROCEDURE — 3078F DIAST BP <80 MM HG: CPT | Mod: CPTII,S$GLB,, | Performed by: INTERNAL MEDICINE

## 2023-02-27 PROCEDURE — 99214 PR OFFICE/OUTPT VISIT, EST, LEVL IV, 30-39 MIN: ICD-10-PCS | Mod: S$GLB,,, | Performed by: INTERNAL MEDICINE

## 2023-02-27 PROCEDURE — 3075F SYST BP GE 130 - 139MM HG: CPT | Mod: CPTII,S$GLB,, | Performed by: INTERNAL MEDICINE

## 2023-02-27 PROCEDURE — 3078F PR MOST RECENT DIASTOLIC BLOOD PRESSURE < 80 MM HG: ICD-10-PCS | Mod: CPTII,S$GLB,, | Performed by: INTERNAL MEDICINE

## 2023-02-27 PROCEDURE — 3288F FALL RISK ASSESSMENT DOCD: CPT | Mod: CPTII,S$GLB,, | Performed by: INTERNAL MEDICINE

## 2023-02-27 PROCEDURE — 1159F MED LIST DOCD IN RCRD: CPT | Mod: CPTII,S$GLB,, | Performed by: INTERNAL MEDICINE

## 2023-02-27 PROCEDURE — 3044F PR MOST RECENT HEMOGLOBIN A1C LEVEL <7.0%: ICD-10-PCS | Mod: CPTII,S$GLB,, | Performed by: INTERNAL MEDICINE

## 2023-02-27 PROCEDURE — 1125F PR PAIN SEVERITY QUANTIFIED, PAIN PRESENT: ICD-10-PCS | Mod: CPTII,S$GLB,, | Performed by: INTERNAL MEDICINE

## 2023-02-27 PROCEDURE — 3008F PR BODY MASS INDEX (BMI) DOCUMENTED: ICD-10-PCS | Mod: CPTII,S$GLB,, | Performed by: INTERNAL MEDICINE

## 2023-02-27 PROCEDURE — 3008F BODY MASS INDEX DOCD: CPT | Mod: CPTII,S$GLB,, | Performed by: INTERNAL MEDICINE

## 2023-02-27 PROCEDURE — 1125F AMNT PAIN NOTED PAIN PRSNT: CPT | Mod: CPTII,S$GLB,, | Performed by: INTERNAL MEDICINE

## 2023-02-27 PROCEDURE — 3288F PR FALLS RISK ASSESSMENT DOCUMENTED: ICD-10-PCS | Mod: CPTII,S$GLB,, | Performed by: INTERNAL MEDICINE

## 2023-02-27 PROCEDURE — 1101F PT FALLS ASSESS-DOCD LE1/YR: CPT | Mod: CPTII,S$GLB,, | Performed by: INTERNAL MEDICINE

## 2023-02-27 PROCEDURE — 1159F PR MEDICATION LIST DOCUMENTED IN MEDICAL RECORD: ICD-10-PCS | Mod: CPTII,S$GLB,, | Performed by: INTERNAL MEDICINE

## 2023-02-27 PROCEDURE — 99999 PR PBB SHADOW E&M-EST. PATIENT-LVL IV: CPT | Mod: PBBFAC,HCNC,, | Performed by: INTERNAL MEDICINE

## 2023-02-27 RX ORDER — ATORVASTATIN CALCIUM 40 MG/1
40 TABLET, FILM COATED ORAL DAILY
Qty: 90 TABLET | Refills: 3 | Status: SHIPPED | OUTPATIENT
Start: 2023-02-27 | End: 2024-02-27

## 2023-02-27 RX ORDER — ATORVASTATIN CALCIUM 40 MG/1
40 TABLET, FILM COATED ORAL DAILY
Qty: 90 TABLET | Refills: 3 | Status: SHIPPED | OUTPATIENT
Start: 2023-02-27 | End: 2023-02-27 | Stop reason: SDUPTHER

## 2023-02-27 RX ORDER — ASPIRIN 81 MG/1
81 TABLET ORAL DAILY
Qty: 30 TABLET | Refills: 0
Start: 2023-02-27 | End: 2024-02-27

## 2023-02-27 RX ORDER — HYDROCHLOROTHIAZIDE 25 MG/1
25 TABLET ORAL DAILY PRN
Qty: 90 TABLET | Refills: 2 | Status: SHIPPED | OUTPATIENT
Start: 2023-02-27 | End: 2023-11-27

## 2023-02-27 NOTE — PROGRESS NOTES
Subjective:   Patient ID:  Triny Mandel is a 72 y.o. female who presents for evaluation of No chief complaint on file.     HSV-2 (herpes simplex virus 2) infection    Edema    Severe obesity (BMI 35.0-35.9 with comorbidity)    Cigarette nicotine dependence    Prediabetes        02/27/2023:   This is a pleasant patient 72 year the age heavy smoker 45 years.  Patient has evidence of emphysema by CT scan also pulmonary nodules.    CT scan also showed moderate coronary atherosclerosis.    Patient has prediabetes edema and shortness of breath.  Intermittent chest discomfort involving her left side of her chest all the way to her left elbow.  This is somewhat concerning for CAD although not happening with classic exertional symptoms.    Patient is obese and EKG showed evidence of LVH.    The patient has advanced risk factors for CAD including heavy smoking history and evidence of atherosclerosis on CT scan.  For this reason she should be on high-dose statin medications I will put her on atorvastatin 40 mg daily recheck lipid levels.    LDL levels are under 130.    Patient will take aspirin daily.  Will go ahead with a cardiac echo to assess LV function given shortness of breath and nuclear scan to rule out cardiac ischemia.    Family History   Problem Relation Age of Onset    Cancer Mother         breast cancer    No Known Problems Sister     No Known Problems Brother     No Known Problems Daughter     No Known Problems Son     No Known Problems Brother     No Known Problems Son      Past Medical History:   Diagnosis Date    Arthritis     Herpes simplex      Social History     Socioeconomic History    Marital status:     Number of children: 4   Occupational History    Occupation: work at [employer]    Tobacco Use    Smoking status: Every Day     Packs/day: 1.00     Years: 45.00     Pack years: 45.00     Types: Cigarettes    Smokeless tobacco: Never   Substance and Sexual Activity    Alcohol use: Yes      Comment: every hoilday     Drug use: Never    Sexual activity: Not Currently     Partners: Male     Current Outpatient Medications on File Prior to Visit   Medication Sig Dispense Refill    hydroCHLOROthiazide (HYDRODIURIL) 12.5 MG Tab Take 1 tablet (12.5 mg total) by mouth daily as needed (swelling). 90 tablet 1     No current facility-administered medications on file prior to visit.     Review of patient's allergies indicates:  No Known Allergies    Review of Systems   Constitutional: Negative for chills, diaphoresis, night sweats, weight gain and weight loss.   HENT:  Negative for congestion, hoarse voice, sore throat and stridor.    Eyes:  Negative for double vision and pain.   Cardiovascular:  Negative for chest pain, claudication, cyanosis, dyspnea on exertion, irregular heartbeat, leg swelling, near-syncope, orthopnea, palpitations, paroxysmal nocturnal dyspnea and syncope.   Respiratory:  Negative for cough, hemoptysis, shortness of breath, sleep disturbances due to breathing, snoring, sputum production and wheezing.    Endocrine: Negative for cold intolerance, heat intolerance and polydipsia.   Hematologic/Lymphatic: Negative for bleeding problem. Does not bruise/bleed easily.   Skin:  Negative for color change, dry skin and rash.   Musculoskeletal:  Negative for joint swelling and muscle cramps.   Gastrointestinal:  Negative for bloating, abdominal pain, constipation, diarrhea, dysphagia, melena, nausea and vomiting.   Genitourinary:  Negative for flank pain and urgency.   Neurological:  Negative for dizziness, focal weakness, headaches, light-headedness, loss of balance, seizures and weakness.   Psychiatric/Behavioral:  Negative for altered mental status and memory loss. The patient is not nervous/anxious.        Objective:     Physical Exam  Eyes:      Pupils: Pupils are equal, round, and reactive to light.   Neck:      Trachea: No tracheal deviation.   Cardiovascular:      Rate and Rhythm: Normal  rate and regular rhythm.      Pulses: Intact distal pulses.           Carotid pulses are 2+ on the right side and 2+ on the left side.       Radial pulses are 2+ on the right side and 2+ on the left side.        Femoral pulses are 2+ on the right side and 2+ on the left side.       Popliteal pulses are 2+ on the right side and 2+ on the left side.        Dorsalis pedis pulses are 2+ on the right side and 2+ on the left side.        Posterior tibial pulses are 2+ on the right side and 2+ on the left side.      Heart sounds: Normal heart sounds. No murmur heard.    No friction rub. No gallop.   Pulmonary:      Effort: Pulmonary effort is normal. No respiratory distress.      Breath sounds: Normal breath sounds. No stridor. No wheezing or rales.   Chest:      Chest wall: No tenderness.   Abdominal:      General: There is no distension.      Tenderness: There is no abdominal tenderness. There is no rebound.   Musculoskeletal:         General: No tenderness.      Cervical back: Normal range of motion.      Right lower leg: Edema present.      Left lower leg: Edema present.   Skin:     General: Skin is warm and dry.   Neurological:      Mental Status: She is alert and oriented to person, place, and time.     EKG shows normal sinus rhythm left axis deviation and left anterior fascicular block LVH.  Bundle-branch block also noted.     Assessment:     1. Encounter to establish care    2. Routine health maintenance    3      CAD  4      chest pain  5      Hyperlipidemia  6.     Prediabetes  7.     Coronary atherosclerosis      Plan:       Impression 1. Chest discomfort possibly cardiac go ahead with a nuclear stress test rule out cardiac ischemia giving heavy smoking history prediabetes and coronary atherosclerosis seen on CT scan.    2. Hyperlipidemia will add atorvastatin given high risk  3. Diabetes continue follow   All questions answered follow-up evaluation again in the next month after cardiac echo and nuclear stress  test.  Patient will also continue taking aspirin 81 mg this has been charted.

## 2023-03-10 ENCOUNTER — TELEPHONE (OUTPATIENT)
Dept: ADMINISTRATIVE | Facility: HOSPITAL | Age: 73
End: 2023-03-10
Payer: MEDICARE

## 2023-03-13 ENCOUNTER — TELEPHONE (OUTPATIENT)
Dept: SMOKING CESSATION | Facility: CLINIC | Age: 73
End: 2023-03-13
Payer: MEDICARE

## 2023-03-17 ENCOUNTER — HOSPITAL ENCOUNTER (OUTPATIENT)
Dept: RADIOLOGY | Facility: HOSPITAL | Age: 73
Discharge: HOME | End: 2023-03-17
Attending: INTERNAL MEDICINE
Payer: MEDICARE

## 2023-03-17 ENCOUNTER — TELEPHONE (OUTPATIENT)
Dept: CARDIOLOGY | Facility: CLINIC | Age: 73
End: 2023-03-17
Payer: MEDICARE

## 2023-03-17 ENCOUNTER — HOSPITAL ENCOUNTER (OUTPATIENT)
Dept: CARDIOLOGY | Facility: HOSPITAL | Age: 73
Discharge: HOME | End: 2023-03-17
Attending: INTERNAL MEDICINE
Payer: MEDICARE

## 2023-03-17 VITALS
SYSTOLIC BLOOD PRESSURE: 130 MMHG | DIASTOLIC BLOOD PRESSURE: 70 MMHG | WEIGHT: 242 LBS | HEIGHT: 63 IN | BODY MASS INDEX: 42.88 KG/M2

## 2023-03-17 DIAGNOSIS — E66.01 MORBID OBESITY WITH BMI OF 40.0-44.9, ADULT: ICD-10-CM

## 2023-03-17 DIAGNOSIS — F17.210 CIGARETTE NICOTINE DEPENDENCE WITHOUT COMPLICATION: ICD-10-CM

## 2023-03-17 DIAGNOSIS — R07.9 CHEST PAIN, UNSPECIFIED TYPE: ICD-10-CM

## 2023-03-17 DIAGNOSIS — Z00.00 ROUTINE HEALTH MAINTENANCE: ICD-10-CM

## 2023-03-17 DIAGNOSIS — I25.118 CORONARY ARTERY DISEASE OF NATIVE ARTERY OF NATIVE HEART WITH STABLE ANGINA PECTORIS: ICD-10-CM

## 2023-03-17 DIAGNOSIS — Z76.89 ENCOUNTER TO ESTABLISH CARE: ICD-10-CM

## 2023-03-17 LAB
AORTIC ROOT ANNULUS: 2.69 CM
ASCENDING AORTA: 2.2 CM
AV INDEX (PROSTH): 0.9
AV MEAN GRADIENT: 4 MMHG
AV PEAK GRADIENT: 7 MMHG
AV VALVE AREA: 2.77 CM2
AV VELOCITY RATIO: 0.86
BSA FOR ECHO PROCEDURE: 2.21 M2
CV ECHO LV RWT: 0.55 CM
CV STRESS BASE HR: 74 BPM
DIASTOLIC BLOOD PRESSURE: 60 MMHG
DOP CALC AO PEAK VEL: 1.33 M/S
DOP CALC AO VTI: 29.3 CM
DOP CALC LVOT AREA: 3.1 CM2
DOP CALC LVOT DIAMETER: 1.98 CM
DOP CALC LVOT PEAK VEL: 1.15 M/S
DOP CALC LVOT STROKE VOLUME: 81.25 CM3
DOP CALC RVOT PEAK VEL: 0.66 M/S
DOP CALC RVOT VTI: 16 CM
DOP CALCLVOT PEAK VEL VTI: 26.4 CM
E WAVE DECELERATION TIME: 199.65 MSEC
E/A RATIO: 0.87
E/E' RATIO: 9.11 M/S
ECHO LV POSTERIOR WALL: 1.15 CM (ref 0.6–1.1)
EJECTION FRACTION: 65 %
FRACTIONAL SHORTENING: 33 % (ref 28–44)
INTERVENTRICULAR SEPTUM: 1.18 CM (ref 0.6–1.1)
IVC DIAMETER: 2.08 CM
LA MAJOR: 3.76 CM
LA MINOR: 4.15 CM
LA WIDTH: 3.3 CM
LEFT ATRIUM SIZE: 2.99 CM
LEFT ATRIUM VOLUME INDEX MOD: 17 ML/M2
LEFT ATRIUM VOLUME INDEX: 15.8 ML/M2
LEFT ATRIUM VOLUME MOD: 35.62 CM3
LEFT ATRIUM VOLUME: 33.09 CM3
LEFT INTERNAL DIMENSION IN SYSTOLE: 2.8 CM (ref 2.1–4)
LEFT VENTRICLE DIASTOLIC VOLUME INDEX: 37.28 ML/M2
LEFT VENTRICLE DIASTOLIC VOLUME: 78.29 ML
LEFT VENTRICLE MASS INDEX: 81 G/M2
LEFT VENTRICLE SYSTOLIC VOLUME INDEX: 14.1 ML/M2
LEFT VENTRICLE SYSTOLIC VOLUME: 29.55 ML
LEFT VENTRICULAR INTERNAL DIMENSION IN DIASTOLE: 4.19 CM (ref 3.5–6)
LEFT VENTRICULAR MASS: 170 G
LV LATERAL E/E' RATIO: 9.11 M/S
LV SEPTAL E/E' RATIO: 9.11 M/S
LVOT MG: 3.06 MMHG
LVOT MV: 0.83 CM/S
MV PEAK A VEL: 0.94 M/S
MV PEAK E VEL: 0.82 M/S
MV STENOSIS PRESSURE HALF TIME: 57.9 MS
MV VALVE AREA P 1/2 METHOD: 3.8 CM2
NUC REST DIASTOLIC VOLUME INDEX: 44
NUC REST EJECTION FRACTION: 80
NUC REST SYSTOLIC VOLUME INDEX: 9
NUC STRESS DIASTOLIC VOLUME INDEX: 51
NUC STRESS EJECTION FRACTION: 82 %
NUC STRESS SYSTOLIC VOLUME INDEX: 9
OHS CV CPX 85 PERCENT MAX PREDICTED HEART RATE MALE: 122
OHS CV CPX ESTIMATED METS: 1
OHS CV CPX MAX PREDICTED HEART RATE: 144
OHS CV CPX PATIENT IS FEMALE: 1
OHS CV CPX PATIENT IS MALE: 0
OHS CV CPX PEAK DIASTOLIC BLOOD PRESSURE: 57 MMHG
OHS CV CPX PEAK HEAR RATE: 108 BPM
OHS CV CPX PEAK RATE PRESSURE PRODUCT: NORMAL
OHS CV CPX PEAK SYSTOLIC BLOOD PRESSURE: 128 MMHG
OHS CV CPX PERCENT MAX PREDICTED HEART RATE ACHIEVED: 75
OHS CV CPX RATE PRESSURE PRODUCT PRESENTING: 9916
PISA TR MAX VEL: 2.9 M/S
PULM VEIN S/D RATIO: 1.28
PV MEAN GRADIENT: 1.09 MMHG
PV PEAK D VEL: 0.47 M/S
PV PEAK S VEL: 0.6 M/S
PV PEAK VELOCITY: 0.84 CM/S
RA MAJOR: 3.91 CM
RA PRESSURE: 3 MMHG
RA WIDTH: 3.17 CM
RIGHT VENTRICULAR END-DIASTOLIC DIMENSION: 2.52 CM
SINUS: 2.48 CM
STJ: 2.18 CM
STRESS ECHO POST EXERCISE DUR SEC: 49 SECONDS
SYSTOLIC BLOOD PRESSURE: 134 MMHG
TDI LATERAL: 0.09 M/S
TDI SEPTAL: 0.09 M/S
TDI: 0.09 M/S
TR MAX PG: 34 MMHG
TV REST PULMONARY ARTERY PRESSURE: 37 MMHG

## 2023-03-17 PROCEDURE — 93018 NUCLEAR STRESS - CARDIOLOGY INTERPRETED (CUPID ONLY): ICD-10-PCS | Mod: HCNC,,, | Performed by: STUDENT IN AN ORGANIZED HEALTH CARE EDUCATION/TRAINING PROGRAM

## 2023-03-17 PROCEDURE — 93017 CV STRESS TEST TRACING ONLY: CPT | Mod: HCNC

## 2023-03-17 PROCEDURE — 93016 NUCLEAR STRESS - CARDIOLOGY INTERPRETED (CUPID ONLY): ICD-10-PCS | Mod: HCNC,,, | Performed by: STUDENT IN AN ORGANIZED HEALTH CARE EDUCATION/TRAINING PROGRAM

## 2023-03-17 PROCEDURE — A9502 TC99M TETROFOSMIN: HCPCS | Mod: HCNC

## 2023-03-17 PROCEDURE — 93306 ECHO (CUPID ONLY): ICD-10-PCS | Mod: 26,HCNC,, | Performed by: STUDENT IN AN ORGANIZED HEALTH CARE EDUCATION/TRAINING PROGRAM

## 2023-03-17 PROCEDURE — 93306 TTE W/DOPPLER COMPLETE: CPT | Mod: 26,HCNC,, | Performed by: STUDENT IN AN ORGANIZED HEALTH CARE EDUCATION/TRAINING PROGRAM

## 2023-03-17 PROCEDURE — 78452 HT MUSCLE IMAGE SPECT MULT: CPT | Mod: 26,HCNC,, | Performed by: STUDENT IN AN ORGANIZED HEALTH CARE EDUCATION/TRAINING PROGRAM

## 2023-03-17 PROCEDURE — 93306 TTE W/DOPPLER COMPLETE: CPT | Mod: HCNC

## 2023-03-17 PROCEDURE — 78452 NUCLEAR STRESS - CARDIOLOGY INTERPRETED (CUPID ONLY): ICD-10-PCS | Mod: 26,HCNC,, | Performed by: STUDENT IN AN ORGANIZED HEALTH CARE EDUCATION/TRAINING PROGRAM

## 2023-03-17 PROCEDURE — 93018 CV STRESS TEST I&R ONLY: CPT | Mod: HCNC,,, | Performed by: STUDENT IN AN ORGANIZED HEALTH CARE EDUCATION/TRAINING PROGRAM

## 2023-03-17 PROCEDURE — 78452 HT MUSCLE IMAGE SPECT MULT: CPT | Mod: HCNC

## 2023-03-17 PROCEDURE — 63600175 PHARM REV CODE 636 W HCPCS: Mod: HCNC | Performed by: INTERNAL MEDICINE

## 2023-03-17 PROCEDURE — 93016 CV STRESS TEST SUPVJ ONLY: CPT | Mod: HCNC,,, | Performed by: STUDENT IN AN ORGANIZED HEALTH CARE EDUCATION/TRAINING PROGRAM

## 2023-03-17 RX ORDER — REGADENOSON 0.08 MG/ML
0.4 INJECTION, SOLUTION INTRAVENOUS ONCE
Status: COMPLETED | OUTPATIENT
Start: 2023-03-17 | End: 2023-03-17

## 2023-03-17 RX ADMIN — REGADENOSON 0.4 MG: 0.08 INJECTION, SOLUTION INTRAVENOUS at 09:03

## 2023-03-17 NOTE — TELEPHONE ENCOUNTER
The patient has been notified of this information and all questions answered.    ----- Message from Ar Chavez MD sent at 3/17/2023  1:57 PM CDT -----  Normal myocardial perfusion no evidence of blockages  ----- Message -----  From: Ricky Barrera MD  Sent: 3/17/2023   1:56 PM CDT  To: Ar Chavez MD

## 2023-03-20 ENCOUNTER — TELEPHONE (OUTPATIENT)
Dept: CARDIOLOGY | Facility: CLINIC | Age: 73
End: 2023-03-20
Payer: MEDICARE

## 2023-03-20 NOTE — TELEPHONE ENCOUNTER
The patient has been notified of this information and all questions answered.      ----- Message from Ar Chavez MD sent at 3/19/2023  9:16 PM CDT -----  Normal echo   ----- Message -----  From: Ricky Barrera MD  Sent: 3/17/2023   7:22 PM CDT  To: Ar Chavez MD

## 2023-03-20 NOTE — TELEPHONE ENCOUNTER
I have attempted without success to contact this patient by phone to discuss echo  results and I left a message on answering machine.    ----- Message from Ar Chavez MD sent at 3/19/2023  9:16 PM CDT -----  Normal echo   ----- Message -----  From: Ricky Barrera MD  Sent: 3/17/2023   7:22 PM CDT  To: Ar Chavez MD

## 2023-03-20 NOTE — TELEPHONE ENCOUNTER
Spoke to patient to give echo results. Patient stated understanding.      ----- Message from Violeta Arroyo sent at 3/20/2023 11:23 AM CDT -----  Contact: brandon  .Type:  Patient Returning Call    Who Called:brandon  Who Left Message for Patient:nurse  Does the patient know what this is regarding?:results  Would the patient rather a call back or a response via [employer]? Call back  Best Call Back Number:.[phone]   Additional Information: patient returning call

## 2023-03-24 ENCOUNTER — TELEPHONE (OUTPATIENT)
Dept: ADMINISTRATIVE | Facility: HOSPITAL | Age: 73
End: 2023-03-24
Payer: MEDICARE

## 2023-04-06 ENCOUNTER — TELEPHONE (OUTPATIENT)
Dept: SMOKING CESSATION | Facility: CLINIC | Age: 73
End: 2023-04-06
Payer: MEDICARE

## 2023-04-06 NOTE — TELEPHONE ENCOUNTER
Text to patient to confirm scheduled 12 pm intake appointment. Patient requested to reschedule to 4/20/2023 at 8:30 am.

## 2023-04-12 ENCOUNTER — OFFICE VISIT (OUTPATIENT)
Dept: FAMILY MEDICINE | Facility: CLINIC | Age: 73
End: 2023-04-12
Payer: MEDICARE

## 2023-04-12 VITALS
WEIGHT: 242.31 LBS | BODY MASS INDEX: 42.93 KG/M2 | OXYGEN SATURATION: 95 % | HEIGHT: 63 IN | DIASTOLIC BLOOD PRESSURE: 65 MMHG | RESPIRATION RATE: 18 BRPM | HEART RATE: 84 BPM | TEMPERATURE: 98 F | SYSTOLIC BLOOD PRESSURE: 140 MMHG

## 2023-04-12 DIAGNOSIS — Z00.00 ENCOUNTER FOR PREVENTIVE HEALTH EXAMINATION: Primary | ICD-10-CM

## 2023-04-12 DIAGNOSIS — Z23 NEED FOR 23-POLYVALENT PNEUMOCOCCAL POLYSACCHARIDE VACCINE: ICD-10-CM

## 2023-04-12 DIAGNOSIS — K63.5 POLYP OF COLON, UNSPECIFIED PART OF COLON, UNSPECIFIED TYPE: ICD-10-CM

## 2023-04-12 DIAGNOSIS — R73.03 PREDIABETES: ICD-10-CM

## 2023-04-12 DIAGNOSIS — R91.1 LUNG NODULE, SOLITARY: ICD-10-CM

## 2023-04-12 DIAGNOSIS — E78.00 HYPERCHOLESTEROLEMIA: ICD-10-CM

## 2023-04-12 DIAGNOSIS — I10 HYPERTENSION, UNSPECIFIED TYPE: ICD-10-CM

## 2023-04-12 DIAGNOSIS — I25.118 CORONARY ARTERY DISEASE OF NATIVE ARTERY OF NATIVE HEART WITH STABLE ANGINA PECTORIS: ICD-10-CM

## 2023-04-12 DIAGNOSIS — B00.9 HSV-2 (HERPES SIMPLEX VIRUS 2) INFECTION: ICD-10-CM

## 2023-04-12 DIAGNOSIS — F17.210 CIGARETTE NICOTINE DEPENDENCE WITHOUT COMPLICATION: ICD-10-CM

## 2023-04-12 DIAGNOSIS — J43.9 PULMONARY EMPHYSEMA, UNSPECIFIED EMPHYSEMA TYPE: ICD-10-CM

## 2023-04-12 DIAGNOSIS — E66.01 MORBID OBESITY WITH BMI OF 40.0-44.9, ADULT: ICD-10-CM

## 2023-04-12 PROBLEM — R07.9 CHEST PAIN: Status: RESOLVED | Noted: 2023-02-27 | Resolved: 2023-04-12

## 2023-04-12 PROCEDURE — 1160F PR REVIEW ALL MEDS BY PRESCRIBER/CLIN PHARMACIST DOCUMENTED: ICD-10-PCS | Mod: HCNC,CPTII,S$GLB, | Performed by: NURSE PRACTITIONER

## 2023-04-12 PROCEDURE — 90732 PPSV23 VACC 2 YRS+ SUBQ/IM: CPT | Mod: HCNC,S$GLB,, | Performed by: NURSE PRACTITIONER

## 2023-04-12 PROCEDURE — 3077F SYST BP >= 140 MM HG: CPT | Mod: HCNC,CPTII,S$GLB, | Performed by: NURSE PRACTITIONER

## 2023-04-12 PROCEDURE — G0009 ADMIN PNEUMOCOCCAL VACCINE: HCPCS | Mod: HCNC,S$GLB,, | Performed by: NURSE PRACTITIONER

## 2023-04-12 PROCEDURE — 99999 PR PBB SHADOW E&M-EST. PATIENT-LVL IV: CPT | Mod: PBBFAC,HCNC,, | Performed by: NURSE PRACTITIONER

## 2023-04-12 PROCEDURE — 99999 PR PBB SHADOW E&M-EST. PATIENT-LVL IV: ICD-10-PCS | Mod: PBBFAC,HCNC,, | Performed by: NURSE PRACTITIONER

## 2023-04-12 PROCEDURE — 1159F PR MEDICATION LIST DOCUMENTED IN MEDICAL RECORD: ICD-10-PCS | Mod: HCNC,CPTII,S$GLB, | Performed by: NURSE PRACTITIONER

## 2023-04-12 PROCEDURE — 1126F AMNT PAIN NOTED NONE PRSNT: CPT | Mod: HCNC,CPTII,S$GLB, | Performed by: NURSE PRACTITIONER

## 2023-04-12 PROCEDURE — G0009 PNEUMOCOCCAL POLYSACCHARIDE VACCINE 23-VALENT =>2YO SQ IM: ICD-10-PCS | Mod: HCNC,S$GLB,, | Performed by: NURSE PRACTITIONER

## 2023-04-12 PROCEDURE — G0439 PPPS, SUBSEQ VISIT: HCPCS | Mod: HCNC,S$GLB,, | Performed by: NURSE PRACTITIONER

## 2023-04-12 PROCEDURE — 3077F PR MOST RECENT SYSTOLIC BLOOD PRESSURE >= 140 MM HG: ICD-10-PCS | Mod: HCNC,CPTII,S$GLB, | Performed by: NURSE PRACTITIONER

## 2023-04-12 PROCEDURE — 3008F BODY MASS INDEX DOCD: CPT | Mod: HCNC,CPTII,S$GLB, | Performed by: NURSE PRACTITIONER

## 2023-04-12 PROCEDURE — 1170F PR FUNCTIONAL STATUS ASSESSED: ICD-10-PCS | Mod: HCNC,CPTII,S$GLB, | Performed by: NURSE PRACTITIONER

## 2023-04-12 PROCEDURE — 90732 PNEUMOCOCCAL POLYSACCHARIDE VACCINE 23-VALENT =>2YO SQ IM: ICD-10-PCS | Mod: HCNC,S$GLB,, | Performed by: NURSE PRACTITIONER

## 2023-04-12 PROCEDURE — 1126F PR PAIN SEVERITY QUANTIFIED, NO PAIN PRESENT: ICD-10-PCS | Mod: HCNC,CPTII,S$GLB, | Performed by: NURSE PRACTITIONER

## 2023-04-12 PROCEDURE — 3078F PR MOST RECENT DIASTOLIC BLOOD PRESSURE < 80 MM HG: ICD-10-PCS | Mod: HCNC,CPTII,S$GLB, | Performed by: NURSE PRACTITIONER

## 2023-04-12 PROCEDURE — 3044F HG A1C LEVEL LT 7.0%: CPT | Mod: HCNC,CPTII,S$GLB, | Performed by: NURSE PRACTITIONER

## 2023-04-12 PROCEDURE — 3078F DIAST BP <80 MM HG: CPT | Mod: HCNC,CPTII,S$GLB, | Performed by: NURSE PRACTITIONER

## 2023-04-12 PROCEDURE — 1170F FXNL STATUS ASSESSED: CPT | Mod: HCNC,CPTII,S$GLB, | Performed by: NURSE PRACTITIONER

## 2023-04-12 PROCEDURE — 1160F RVW MEDS BY RX/DR IN RCRD: CPT | Mod: HCNC,CPTII,S$GLB, | Performed by: NURSE PRACTITIONER

## 2023-04-12 PROCEDURE — 3044F PR MOST RECENT HEMOGLOBIN A1C LEVEL <7.0%: ICD-10-PCS | Mod: HCNC,CPTII,S$GLB, | Performed by: NURSE PRACTITIONER

## 2023-04-12 PROCEDURE — 1159F MED LIST DOCD IN RCRD: CPT | Mod: HCNC,CPTII,S$GLB, | Performed by: NURSE PRACTITIONER

## 2023-04-12 PROCEDURE — G0439 PR MEDICARE ANNUAL WELLNESS SUBSEQUENT VISIT: ICD-10-PCS | Mod: HCNC,S$GLB,, | Performed by: NURSE PRACTITIONER

## 2023-04-12 PROCEDURE — 3008F PR BODY MASS INDEX (BMI) DOCUMENTED: ICD-10-PCS | Mod: HCNC,CPTII,S$GLB, | Performed by: NURSE PRACTITIONER

## 2023-04-12 RX ORDER — VALACYCLOVIR HYDROCHLORIDE 500 MG/1
500 TABLET, FILM COATED ORAL 2 TIMES DAILY
COMMUNITY
End: 2023-04-12

## 2023-04-12 RX ORDER — VALACYCLOVIR HYDROCHLORIDE 1 G/1
1000 TABLET, FILM COATED ORAL 2 TIMES DAILY
Qty: 20 TABLET | Refills: 0 | Status: SHIPPED | OUTPATIENT
Start: 2023-04-12 | End: 2023-04-22

## 2023-04-12 NOTE — PROGRESS NOTES
"  Triny Mandel presented for a  Medicare AWV and comprehensive Health Risk Assessment today. The following components were reviewed and updated:    Medical history  Family History  Social history  Allergies and Current Medications  Health Risk Assessment  Health Maintenance  Care Team         ** See Completed Assessments for Annual Wellness Visit within the encounter summary.**         The following assessments were completed:  Living Situation  CAGE  Depression Screening  Timed Get Up and Go  Whisper Test  Cognitive Function Screening  Nutrition Screening  ADL Screening  PAQ Screening        Vitals:    04/12/23 0803   BP: (!) 140/65   Pulse: 84   Resp: 18   Temp: 97.9 °F (36.6 °C)   SpO2: 95%   Weight: 109.9 kg (242 lb 4.6 oz)   Height: 5' 3" (1.6 m)     Body mass index is 42.92 kg/m².  Physical Exam  Vitals and nursing note reviewed.   Constitutional:       Appearance: Normal appearance. She is well-developed. She is obese.   HENT:      Head: Normocephalic and atraumatic.   Eyes:      Pupils: Pupils are equal, round, and reactive to light.   Neck:      Vascular: No carotid bruit.   Cardiovascular:      Rate and Rhythm: Normal rate and regular rhythm.      Pulses: Normal pulses.      Heart sounds: Normal heart sounds. No murmur heard.    No gallop.   Pulmonary:      Effort: Pulmonary effort is normal.      Breath sounds: Normal breath sounds.   Abdominal:      General: Bowel sounds are normal. There is no distension.      Palpations: Abdomen is soft.      Tenderness: There is no abdominal tenderness.   Musculoskeletal:         General: No tenderness. Normal range of motion.   Skin:     General: Skin is warm and dry.   Neurological:      Mental Status: She is alert.      Motor: No abnormal muscle tone.      Gait: Gait normal.   Psychiatric:         Speech: Speech normal.         Behavior: Behavior normal.         Thought Content: Thought content normal.         Judgment: Judgment normal.     Current Outpatient " Medications   Medication Instructions    aspirin (ECOTRIN) 81 mg, Oral, Daily    atorvastatin (LIPITOR) 40 mg, Oral, Daily    hydroCHLOROthiazide (HYDRODIURIL) 25 mg, Oral, Daily PRN    valACYclovir (VALTREX) 1,000 mg, Oral, 2 times daily             Diagnoses and health risks identified today and associated recommendations/orders:    1. Encounter for preventive health examination  Review for Opioid Screening: Patient does not have rx for Opioids.  Review for Substance Use Disorders: Patient does not use substance.    2. Need for 23-polyvalent pneumococcal polysaccharide vaccine   (In Office Administered) Pneumococcal Polysaccharide Vaccine (23 Valent) (SQ/IM) given    3. Morbid obesity with BMI of 40.0-44.9, adult  BMI: 42.92 kg/m²  Adj Wt: 75.4 kg (166 lb 3.6 oz  Discussed and recommend  low fat/carb/chol diet. Cardio exercise as tolerated. Life style modifications.    4. Coronary artery disease of native artery of native heart with stable angina pectoris  Chronic/ Monitored/Stable on Lipitor and ASA. Denies CP- stress test and echo done 1/ 20223  Followed by card md    5. Pulmonary emphysema, unspecified emphysema type  Chronic/ Monitored/Stable - scheduled for repeat   CT lungs 8/ 2023  Followed by pulm md    6. Hypercholesterolemia  Chronic/ Monitored/Stable on  Crestor  as directed.  Discussed and recommend  low fat/carb/chol diet. Cardio exercise as tolerated. Life style modifications.  Followed by PCP  and card md    7. Hypertension, unspecified type  Chronic and Ongoing. States she has taken  htcz this am  Repeat BP check with  PCP    8. Lung nodule, solitary  Chronic/ Monitored/Stable - scheduled for repeat   CT lungs 8/ 2023  Followed by pulm md    9. Prediabetes  Discussed and recommend  low fat/carb/chol diet. Cardio exercise as tolerated. Life style modifications.    10. Cigarette nicotine dependence without complication  Chronic and Ongoing. Still smokes daily- schedule to going to smoking cessation  program 4/202/23    11. HSV-2 (herpes simplex virus 2) infection  Chronic/ Monitored/Stable -states occasional flare- request a refill - 1 x refill given- instructed to follow up with PCP for additional refill      12. Polyp of colon, unspecified part of colon, unspecified type  Colonoscopy    Due date: 6/22/2023   Last completion date: 6/22/2022    Frequency: Yearly   Inform pt due to poor prep and polyp noted. Will scheduled at later date       1 year annual I offered to discuss advanced care planning, including how to pick a person who would make decisions for you if you were unable to make them for yourself, called a health care power of , and what kind of decisions you might make such as use of life sustaining treatments such as ventilators and tube feeding when faced with a life limiting illness recorded on a living will that they will need to know. (How you want to be cared for as you near the end of your natural life)     X Patient is interested in learning more about how to make advanced directives.  I provided them paperwork and offered to discuss this with them.    Provided Triny with a 5-10 year written screening schedule and personal prevention plan. Recommendations were developed using the USPHS age appropriate recommendations. Education, counseling, and referrals were provided as needed. After Visit Summary printed and given to patient which includes a list of additional screenings\tests needed.

## 2023-04-12 NOTE — PATIENT INSTRUCTIONS
Counseling and Referral of Other Preventative  (Italic type indicates deductible and co-insurance are waived)    Patient Name: Triny Mandel  Today's Date: 4/12/2023    Health Maintenance       Date Due Completion Date    TETANUS VACCINE Never done ---    DEXA Scan Never done ---    Shingles Vaccine (1 of 2) Never done ---    Pneumococcal Vaccines (Age 65+) (2 - PPSV23 if available, else PCV20) 04/06/2020 2/10/2020    Mammogram 08/20/2021 8/20/2020    COVID-19 Vaccine (4 - Booster for Pfizer series) 01/25/2022 11/30/2021    Influenza Vaccine (1) Never done ---    Colorectal Cancer Screening 06/22/2023 6/22/2022    Override on 7/30/2020: Done (due 1 year due to suboptimal bowel cleanse)    Hemoglobin A1c (Prediabetes) 01/23/2024 1/23/2023    LDCT Lung Screen 02/01/2024 2/1/2023    High Dose Statin 02/27/2024 2/27/2023    Aspirin/Antiplatelet Therapy 02/27/2024 2/27/2023    Lipid Panel 01/23/2028 1/23/2023        No orders of the defined types were placed in this encounter.    The following information is provided to all patients.  This information is to help you find resources for any of the problems found today that may be affecting your health:                Living healthy guide: www.[facility].[place].gov      Understanding Diabetes: www.diabetes.org      Eating healthy: www.cdc.gov/healthyweight      CDC home safety checklist: www.cdc.gov/steadi/patient.html      Agency on Aging: www.goea.[place].[facility]      Alcoholics anonymous (AA): www.aa.org      Physical Activity: www.mac.nih.gov/mu7ylec      Tobacco use: www.quitwithusla.org

## 2023-04-17 ENCOUNTER — TELEPHONE (OUTPATIENT)
Dept: SMOKING CESSATION | Facility: CLINIC | Age: 73
End: 2023-04-17
Payer: MEDICARE

## 2023-04-20 ENCOUNTER — TELEPHONE (OUTPATIENT)
Dept: SMOKING CESSATION | Facility: CLINIC | Age: 73
End: 2023-04-20
Payer: MEDICARE

## 2023-04-20 ENCOUNTER — CLINICAL SUPPORT (OUTPATIENT)
Dept: SMOKING CESSATION | Facility: CLINIC | Age: 73
End: 2023-04-20
Payer: COMMERCIAL

## 2023-04-20 DIAGNOSIS — F17.200 NICOTINE DEPENDENCE: Primary | ICD-10-CM

## 2023-04-20 PROCEDURE — 99404 PR PREVENT COUNSEL,INDIV,60 MIN: ICD-10-PCS | Mod: S$GLB,,, | Performed by: SPEECH-LANGUAGE PATHOLOGIST

## 2023-04-20 PROCEDURE — 99404 PREV MED CNSL INDIV APPRX 60: CPT | Mod: S$GLB,,, | Performed by: SPEECH-LANGUAGE PATHOLOGIST

## 2023-04-20 RX ORDER — DIPHENHYDRAMINE HCL 25 MG
4 CAPSULE ORAL
Qty: 360 EACH | Refills: 0 | Status: SHIPPED | OUTPATIENT
Start: 2023-04-20 | End: 2023-05-11 | Stop reason: SDUPTHER

## 2023-04-20 RX ORDER — BUPROPION HYDROCHLORIDE 150 MG/1
TABLET, EXTENDED RELEASE ORAL
Qty: 60 TABLET | Refills: 0 | Status: SHIPPED | OUTPATIENT
Start: 2023-04-20 | End: 2023-05-11 | Stop reason: SDUPTHER

## 2023-04-20 RX ORDER — IBUPROFEN 200 MG
1 TABLET ORAL DAILY
Qty: 30 PATCH | Refills: 0 | Status: SHIPPED | OUTPATIENT
Start: 2023-04-20 | End: 2023-07-13 | Stop reason: SDUPTHER

## 2023-04-20 NOTE — PROGRESS NOTES
At [facility], patient reports smoking 20 cpd. Assessed CO with patient reporting smoking 2 cigarettes prior. CO 28. Discussed the role of tobacco cessation program, role of NRT & behavioral changes to assist the patient to reach her goal of being tobacco free. The patient reports she is willing to utilize Wellbutrin, 4 mg & 21 mg patch & will return for a follow up visit.  Education & instruction on the role of the NRT, usage & proper placement of the patch, dosage of Wellbutrin & chew & park technique.The patient verbalized understanding & willingness to apply. Patient instructed to call CTTS anytime. Follow up visit set with the patient for 5/11/2023 at 10:00 am. Patient states her goal is to work toward her quit & use her products & make some changes.

## 2023-05-04 ENCOUNTER — TELEPHONE (OUTPATIENT)
Dept: SMOKING CESSATION | Facility: CLINIC | Age: 73
End: 2023-05-04
Payer: MEDICARE

## 2023-05-04 NOTE — TELEPHONE ENCOUNTER
Received a call from patient who states she didn't get her buproprion or 4 mg gum from the pharmacy. CTTS left a voicemail for [facility] to follow up.

## 2023-05-08 ENCOUNTER — TELEPHONE (OUTPATIENT)
Dept: FAMILY MEDICINE | Facility: CLINIC | Age: 73
End: 2023-05-08
Payer: MEDICARE

## 2023-05-08 NOTE — TELEPHONE ENCOUNTER
----- Message from Emily Tirado sent at 5/8/2023  2:06 PM CDT -----  Contact: [phone]  Calling requesting mammo orders . Please call back at [phone] . Thanks/lillian

## 2023-05-08 NOTE — TELEPHONE ENCOUNTER
Attempted to call pt and no answer. [facility]    Pt is scheduled for mammo on 5/18    Advised [employer].

## 2023-05-11 ENCOUNTER — CLINICAL SUPPORT (OUTPATIENT)
Dept: SMOKING CESSATION | Facility: CLINIC | Age: 73
End: 2023-05-11
Payer: COMMERCIAL

## 2023-05-11 DIAGNOSIS — F17.200 NICOTINE DEPENDENCE: Primary | ICD-10-CM

## 2023-05-11 PROCEDURE — 99404 PREV MED CNSL INDIV APPRX 60: CPT | Mod: S$GLB,,, | Performed by: SPEECH-LANGUAGE PATHOLOGIST

## 2023-05-11 PROCEDURE — 99404 PR PREVENT COUNSEL,INDIV,60 MIN: ICD-10-PCS | Mod: S$GLB,,, | Performed by: SPEECH-LANGUAGE PATHOLOGIST

## 2023-05-11 RX ORDER — BUPROPION HYDROCHLORIDE 150 MG/1
TABLET, EXTENDED RELEASE ORAL
Qty: 60 TABLET | Refills: 0 | Status: SHIPPED | OUTPATIENT
Start: 2023-05-11 | End: 2023-06-22 | Stop reason: SDUPTHER

## 2023-05-11 RX ORDER — DIPHENHYDRAMINE HCL 25 MG
4 CAPSULE ORAL
Qty: 360 EACH | Refills: 0 | Status: SHIPPED | OUTPATIENT
Start: 2023-05-11 | End: 2023-06-22 | Stop reason: SDUPTHER

## 2023-05-11 NOTE — PROGRESS NOTES
Individual Follow-Up Form    5/11/2023    Quit Date: TBD    Clinical Status of Patient: Outpatient    Continuing Medication: yes  Wellbutrin, 4 mg gum, 21 mg patches (hasn't used the patches yet)    Other Medications:      Target Symptoms: Withdrawal and medication side effects. The following were  rated moderate (3) to severe (4) on TCRS:  Moderate (3): none  Severe (4): none    Comments: Telephone visit at the patient's request. She reports she met her goal of using her products & making some changes. She reports she is smoking about 7 cpd down from 20 cpd at [facility]. Praised the patient on her gains & progress. She reports she is utilizing her Wellbutrin 150 mg twice daily & the 4 mg gum; but hasn't used her 21 mg patches. She credits her success to her Wellbutrin & gum & denies any negative side effects or mood changes as well as using the cinnamon toothpicks. At the time of this call, patient reports smoking 2 cigarettes prior due to her toilet was overflowing. Administered TCRS with patient reporting slight symptoms of desire/craving & restless/impatient. Discussed with patient the s/s of nicotine withdrawal that played into this agitation. Patient states she has been proud of herself of what she has accomplished. She states she has been using foods like fruits instead of reaching for a coke & cigarette. Praised the patient on her efforts & gains. Patient reports she has made her car a smoke free zone; but not her house just yet; but that she is willing to make her home smoke free & use her patches. She reports the information that she is learning during our sessions are helpful with her making changes & that she is proud doing something she never thought she could do. Patient's goal is to keep making progress. Follow up set for 6/1/2023 at 10:00 am.     Diagnosis: F17.200    Next Visit: 3 weeks

## 2023-06-01 ENCOUNTER — TELEPHONE (OUTPATIENT)
Dept: SMOKING CESSATION | Facility: CLINIC | Age: 73
End: 2023-06-01
Payer: MEDICARE

## 2023-06-01 NOTE — TELEPHONE ENCOUNTER
Text to patient to confirm scheduled 10:00 am appointment today. Patient asks to reschedule due to a . Rescheduled for 2023 at 3 pm

## 2023-06-22 ENCOUNTER — CLINICAL SUPPORT (OUTPATIENT)
Dept: SMOKING CESSATION | Facility: CLINIC | Age: 73
End: 2023-06-22
Payer: COMMERCIAL

## 2023-06-22 DIAGNOSIS — F17.200 NICOTINE DEPENDENCE: Primary | ICD-10-CM

## 2023-06-22 PROCEDURE — 99404 PR PREVENT COUNSEL,INDIV,60 MIN: ICD-10-PCS | Mod: S$GLB,,, | Performed by: SPEECH-LANGUAGE PATHOLOGIST

## 2023-06-22 PROCEDURE — 99404 PREV MED CNSL INDIV APPRX 60: CPT | Mod: S$GLB,,, | Performed by: SPEECH-LANGUAGE PATHOLOGIST

## 2023-06-22 RX ORDER — BUPROPION HYDROCHLORIDE 150 MG/1
TABLET, EXTENDED RELEASE ORAL
Qty: 60 TABLET | Refills: 0 | Status: SHIPPED | OUTPATIENT
Start: 2023-06-22 | End: 2023-07-13 | Stop reason: SDUPTHER

## 2023-06-22 RX ORDER — DIPHENHYDRAMINE HCL 25 MG
4 CAPSULE ORAL
Qty: 360 EACH | Refills: 0 | Status: SHIPPED | OUTPATIENT
Start: 2023-06-22 | End: 2023-07-13 | Stop reason: SDUPTHER

## 2023-06-22 NOTE — PROGRESS NOTES
Individual Follow-Up Form    2023    Quit Date: TBD    Clinical Status of Patient: Outpatient    Continuing Medication: yes Wellbutrin, 4 mg gum, 21 mg patches (hasn't used the patches yet)    Other Medications:      Target Symptoms: Withdrawal and medication side effects. The following were  rated moderate (3) to severe (4) on TCRS:  Moderate (3): insomnia  Severe (4): none    Comments: Telephone visit at the patient's request. Patient reports she was doing well; but got off track due to a ; but got back on track & is smoking 5 cpd. At the time of this call, patient reports smoking 2.5 cigarettes. She reports she had made her home a smoke free zone; but after the , she returned back to smoking inside; but states she is switching back. She reports she is taking her Wellbutrin 150 mg twice daily & using her 4 mg gum. Patient states she hasn't used her 21 mg patches yet. Administered TCRS with patient reporting moderate symptoms of insomnia. Re-education on patch usage as well as gum technique. CTTS will mail patient a copy in the mail for reference. Discussed with patient utilizing gum when she leaves work instead of smoking a cigarette. Patient is willing to apply & is willing to utilize her patches. Patient states her goal is to be down for what she is smoking now. Follow up set for 2023 at 5:00 pm.     Diagnosis: F17.200    Next Visit: 3 weeks

## 2023-07-13 ENCOUNTER — CLINICAL SUPPORT (OUTPATIENT)
Dept: SMOKING CESSATION | Facility: CLINIC | Age: 73
End: 2023-07-13
Payer: COMMERCIAL

## 2023-07-13 DIAGNOSIS — F17.200 NICOTINE DEPENDENCE: Primary | ICD-10-CM

## 2023-07-13 PROCEDURE — 99404 PR PREVENT COUNSEL,INDIV,60 MIN: ICD-10-PCS | Mod: S$GLB,,, | Performed by: SPEECH-LANGUAGE PATHOLOGIST

## 2023-07-13 PROCEDURE — 99404 PREV MED CNSL INDIV APPRX 60: CPT | Mod: S$GLB,,, | Performed by: SPEECH-LANGUAGE PATHOLOGIST

## 2023-07-13 RX ORDER — BUPROPION HYDROCHLORIDE 150 MG/1
TABLET, EXTENDED RELEASE ORAL
Qty: 60 TABLET | Refills: 0 | Status: SHIPPED | OUTPATIENT
Start: 2023-07-13 | End: 2023-10-05 | Stop reason: SDUPTHER

## 2023-07-13 RX ORDER — IBUPROFEN 200 MG
1 TABLET ORAL DAILY
Qty: 30 PATCH | Refills: 0 | Status: SHIPPED | OUTPATIENT
Start: 2023-07-13 | End: 2023-10-26

## 2023-07-13 RX ORDER — DIPHENHYDRAMINE HCL 25 MG
4 CAPSULE ORAL
Qty: 360 EACH | Refills: 0 | Status: SHIPPED | OUTPATIENT
Start: 2023-07-13 | End: 2023-11-30

## 2023-07-13 RX ORDER — ASPIRIN/CALCIUM CARB/MAGNESIUM 325 MG
4 TABLET ORAL
Qty: 300 LOZENGE | Refills: 0 | Status: SHIPPED | OUTPATIENT
Start: 2023-07-13 | End: 2023-11-30

## 2023-07-13 NOTE — PROGRESS NOTES
Individual Follow-Up Form    7/13/2023    Quit Date: possibly today 7/13/2023 due to patient hasn't smoked since midnight     Clinical Status of Patient: Outpatient     Continuing Medication: yes Wellbutrin, 4 mg gum, 21 mg patches      Other Medications: ordered 4 mg lozenges this date     Target Symptoms: Withdrawal and medication side effects. The following were  rated moderate (3) to severe (4) on TCRS:  Moderate (3): none  Severe (4): none    Comments: Telephone visit at the patient's request. Patient reports she is down to 2 cigarettes per day down from 5 cpd & hasn't smoked since midnight last night.  Praised patient on her continued gains & progress. Patient reports she is utilizing her NRT regimen of Wellbutrin 150 mg twice daily, 4 mg gum & 21 mg patches.  Administered TCRS with patient reporting mild sweating more than usual which she states happens at night. Discussed with patient possible side effect of Wellbutrin. Patient reports she will break her medicine down to see. Discussed strategies the patient has been using to assist her which includes using her Wellbutrin, 4 mg gum, 21 mg patches, cinnamon toothpicks, straws & keeping busy. Discussed NRT & CTTS will add 4 mg lozenges to the patient's regimen to assist with helping her quit. Discussed adequate nutrition & hydration. Discussed 4 d's & drinking water to increase hydration. Patient is willing to go for her quit. Follow up set for 8/10/2023 at 5:00 pm.     Diagnosis: F17.200    Next Visit: 4 weeks

## 2023-07-19 ENCOUNTER — TELEPHONE (OUTPATIENT)
Dept: SMOKING CESSATION | Facility: CLINIC | Age: 73
End: 2023-07-19
Payer: MEDICARE

## 2023-07-27 ENCOUNTER — TELEPHONE (OUTPATIENT)
Dept: SMOKING CESSATION | Facility: CLINIC | Age: 73
End: 2023-07-27
Payer: MEDICARE

## 2023-08-02 ENCOUNTER — TELEPHONE (OUTPATIENT)
Dept: SMOKING CESSATION | Facility: CLINIC | Age: 73
End: 2023-08-02
Payer: MEDICARE

## 2023-08-02 NOTE — TELEPHONE ENCOUNTER
Third attempt left message regarding smoking cessation quit 1 episode, will call back at a later time. Completed smart form per CTTS notes from 7/13/23 visit.

## 2023-08-10 ENCOUNTER — CLINICAL SUPPORT (OUTPATIENT)
Dept: SMOKING CESSATION | Facility: CLINIC | Age: 73
End: 2023-08-10
Payer: COMMERCIAL

## 2023-08-10 DIAGNOSIS — F17.200 NICOTINE DEPENDENCE: Primary | ICD-10-CM

## 2023-08-10 PROCEDURE — 99404 PREV MED CNSL INDIV APPRX 60: CPT | Mod: S$GLB,,, | Performed by: SPEECH-LANGUAGE PATHOLOGIST

## 2023-08-10 PROCEDURE — 99404 PR PREVENT COUNSEL,INDIV,60 MIN: ICD-10-PCS | Mod: S$GLB,,, | Performed by: SPEECH-LANGUAGE PATHOLOGIST

## 2023-08-10 NOTE — PROGRESS NOTES
Individual Follow-Up Form    8/10/2023    Quit Date: possibly 8/11/2023 due to patient hasn't smoked since 7 am today     Clinical Status of Patient: Outpatient     Continuing Medication: yes Wellbutrin, 4 mg gum, 21 mg patches, 4 mg lozenges     Other Medications:      Target Symptoms: Withdrawal and medication side effects. The following were  rated moderate (3) to severe (4) on TCRS:  Moderate (3):   Severe (4):     Comments: Telephone visit at the patient's request. Patient reports she is down to smoking 1 cpd. She reports she hasn't smoked since 7 am today due to getting a cigarette from her niece. Patient reports she hasn't been buying cigarettes.  Patient reports utilizing her NRT of Wellbutrin, 4 mg gum, 21 mg patches, 4 mg lozenges & denies any negative side effects or mood changes. Discussed strategies for the patient to apply over the next few hours & days to include telling others around her that she has quit & to offer words of empowerment rather than getting a cigarette from them, having something to chew on & walking away. Discussed with patient utilizing her gum & lozenges every few hours to get ahead of the craving to kick off her quit since the patient has gone almost 12 hours without smoking.Patient is willing to go for her quit. Patient states her goal is to be smoke free by next session. Follow up set for 9/7/2023 at 5:00 pm.     Diagnosis: F17.200    Next Visit: 4 weeks

## 2023-09-07 ENCOUNTER — CLINICAL SUPPORT (OUTPATIENT)
Dept: SMOKING CESSATION | Facility: CLINIC | Age: 73
End: 2023-09-07
Payer: COMMERCIAL

## 2023-09-07 DIAGNOSIS — F17.200 NICOTINE DEPENDENCE: Primary | ICD-10-CM

## 2023-09-07 PROCEDURE — 99404 PR PREVENT COUNSEL,INDIV,60 MIN: ICD-10-PCS | Mod: S$GLB,,, | Performed by: SPEECH-LANGUAGE PATHOLOGIST

## 2023-09-07 PROCEDURE — 99404 PREV MED CNSL INDIV APPRX 60: CPT | Mod: S$GLB,,, | Performed by: SPEECH-LANGUAGE PATHOLOGIST

## 2023-09-07 NOTE — PROGRESS NOTES
Individual Follow-Up Form    9/7/2023    Quit Date: 8/11/2023      Clinical Status of Patient: Outpatient     Continuing Medication: yes Wellbutrin, 4 mg gum, 21 mg patches, 4 mg lozenges     Other Medications:      Target Symptoms: Withdrawal and medication side effects. The following were  rated moderate (3) to severe (4) on TCRS:  Moderate (3): increased appetite/hunger  Severe (4): none    Comments: Telephone visit due to the patient's job. Patient reports she has been smoke free since our last session. Congratulated & praised the patient on her gains & accomplishment. Patient reports she remains on her NRT of Wellbutrin, 4 mg gum, 21 mg patches, 4 mg lozenges.  Administered TCRS with patient reporting moderate symptoms of increased appetite/hunger. Discussed the s/s of nicotine withdrawal & the amount of time she may deal with it. She reports she does continue to deal with sweating; however states it's manageable. Discussed health & financial gains the patient has encountered since her quit. Patient reports she feels more relaxed & calmer as well as has more energy. She denies any new side effects or mood changes. Discussed the role of remaining on NRT for a few weeks after a quit to allow the patient increased success with her quit for long term success. Upcoming challenges is a party that the patient has where there will be other smokers. Patient states she was able to manage a party with other smokers during her initial quit & she feels she is stronger now to handle it. Discussed with patient to utilize strategies such as her NRT & moving away from the smoke if necessary. Patient is agreeable. Patient's goal is to remain smoke free. Follow up set for 10/5/2023 at 5:00 pm.     Diagnosis: F17.200    Next Visit: 4 weeks

## 2023-10-05 ENCOUNTER — CLINICAL SUPPORT (OUTPATIENT)
Dept: SMOKING CESSATION | Facility: CLINIC | Age: 73
End: 2023-10-05
Payer: COMMERCIAL

## 2023-10-05 DIAGNOSIS — F17.200 NICOTINE DEPENDENCE: Primary | ICD-10-CM

## 2023-10-05 PROCEDURE — 99403 PREV MED CNSL INDIV APPRX 45: CPT | Mod: S$GLB,,, | Performed by: SPEECH-LANGUAGE PATHOLOGIST

## 2023-10-05 PROCEDURE — 99403 PR PREVENT COUNSEL,INDIV,45 MIN: ICD-10-PCS | Mod: S$GLB,,, | Performed by: SPEECH-LANGUAGE PATHOLOGIST

## 2023-10-05 RX ORDER — BUPROPION HYDROCHLORIDE 150 MG/1
TABLET, EXTENDED RELEASE ORAL
Qty: 60 TABLET | Refills: 0 | Status: SHIPPED | OUTPATIENT
Start: 2023-10-05

## 2023-10-05 NOTE — PROGRESS NOTES
Individual Follow-Up Form    10/5/2023    Quit Date: 8/11/2023       Clinical Status of Patient: Outpatient     Continuing Medication: yes Wellbutrin, 4 mg gum, 21 mg patches, 4 mg lozenges     Other Medications:                  Target Symptoms: Withdrawal and medication side effects. The following were   rated moderate (3) to severe (4) on TCRS:  Moderate (3): increased appetite/hunger  Severe (4): none    Comments: Telephone visit due to the patient's work schedule. Patient reports she went to the party around other smokers & didn't smoke & has remained smoke free since her quit date of 8/11/2023 & remains on her NRT of 21 mg patches, 4 mg gum & 4 mg lozenges & Wellbutrin 150 mg twice daily. She denies any negative side effects or mood changes. Administered TCRS with patient reporting slight symptoms of desire/crave & moderate symptoms of increased appetite/hunger. Patient states she uses the Wellbutrin, gum & lozenges more than the patches. Discussed with patient the normalcy of experiencing urges/craves & withdrawal that may occur when she's not wearing her patches. Patient states she is really proud of herself & her accomplishment because she did something she didn't think she could do. She is looking forward to having her first smoke free birthday on 10/27[birth year]23. Session ended to allow the patient to go & feed her patient.  Patient's goal is to remain smoke free. Follow up set for 10/26/2023 at 5:00 pm.      Diagnosis: F17.200    Next Visit: 3 weeks

## 2023-10-26 ENCOUNTER — TELEPHONE (OUTPATIENT)
Dept: SMOKING CESSATION | Facility: CLINIC | Age: 73
End: 2023-10-26
Payer: MEDICARE

## 2023-10-26 ENCOUNTER — CLINICAL SUPPORT (OUTPATIENT)
Dept: SMOKING CESSATION | Facility: CLINIC | Age: 73
End: 2023-10-26
Payer: COMMERCIAL

## 2023-10-26 DIAGNOSIS — F17.200 NICOTINE DEPENDENCE: Primary | ICD-10-CM

## 2023-10-26 PROCEDURE — 99403 PREV MED CNSL INDIV APPRX 45: CPT | Mod: S$GLB,,, | Performed by: SPEECH-LANGUAGE PATHOLOGIST

## 2023-10-26 PROCEDURE — 99403 PR PREVENT COUNSEL,INDIV,45 MIN: ICD-10-PCS | Mod: S$GLB,,, | Performed by: SPEECH-LANGUAGE PATHOLOGIST

## 2023-10-26 RX ORDER — IBUPROFEN 200 MG
1 TABLET ORAL DAILY
Qty: 30 PATCH | Refills: 0 | Status: SHIPPED | OUTPATIENT
Start: 2023-10-26 | End: 2023-11-30 | Stop reason: SDUPTHER

## 2023-10-26 NOTE — TELEPHONE ENCOUNTER
Call to patient for her scheduled phone appointment. 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

## 2023-10-26 NOTE — PROGRESS NOTES
Individual Follow-Up Form    10/26/2023    Quit Date: 8/11/2023       Clinical Status of Patient: Outpatient     Continuing Medication: yes Wellbutrin, 4 mg gum, 21 mg patches, 4 mg lozenges (hasn't taken Wellbutrin in 1 week)     Other Medications: ordered 14 mg patches this date                  Target Symptoms: Withdrawal and medication side effects. The following were   rated moderate (3) to severe (4) on TCRS:  Moderate (3):   Severe (4):        Comments: Delay with check in due to the patient had to tend to her client & asked for CTTS to call her later than her scheduled appointment time.  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 Patient reports she has remained smoke free since her 8/11/2023 quit date & she is excited to celebrate her 1st smoke free birthday tomorrow. Celebrated with the patient on her continued accomplishment. She reports she continues to use her 21 mg patches & 4 mg gum & that she hasn't taken her Wellbutrin in a week. Discussed with patient to assess if her sweating at night stops now that she is off the Wellbutrin. Discussed NRT. Patient is open to moving down to the 14 mg patches. She reports an episode of thinking of a cigarette this am due to receiving a call from her granddaughter. She reports level 5 out of 1-10 for the craving. Discussed the normalcy of urges/craves with sensory cues & stress. Patient states she knows a cigarette wouldn't change any situation & that she feels too good without them. She is enjoying how she is feeling & the financial gains from her quit. Session ended to allow the patient to tend to her client. Patient's goal is to remain smoke free. Follow up set for 11/30/2023 at 5:00 pm.     Diagnosis: F17.200    Next Visit: 5 weeks.

## 2023-11-26 DIAGNOSIS — Z76.0 MEDICATION REFILL: ICD-10-CM

## 2023-11-26 DIAGNOSIS — R60.9 EDEMA, UNSPECIFIED TYPE: ICD-10-CM

## 2023-11-27 RX ORDER — HYDROCHLOROTHIAZIDE 25 MG/1
TABLET ORAL
Qty: 90 TABLET | Refills: 2 | Status: SHIPPED | OUTPATIENT
Start: 2023-11-27

## 2023-11-30 ENCOUNTER — CLINICAL SUPPORT (OUTPATIENT)
Dept: SMOKING CESSATION | Facility: CLINIC | Age: 73
End: 2023-11-30
Payer: COMMERCIAL

## 2023-11-30 DIAGNOSIS — F17.200 NICOTINE DEPENDENCE: Primary | ICD-10-CM

## 2023-11-30 PROCEDURE — 99403 PR PREVENT COUNSEL,INDIV,45 MIN: ICD-10-PCS | Mod: S$GLB,,, | Performed by: SPEECH-LANGUAGE PATHOLOGIST

## 2023-11-30 PROCEDURE — 99403 PREV MED CNSL INDIV APPRX 45: CPT | Mod: S$GLB,,, | Performed by: SPEECH-LANGUAGE PATHOLOGIST

## 2023-11-30 RX ORDER — IBUPROFEN 200 MG
1 TABLET ORAL DAILY
Qty: 30 PATCH | Refills: 3 | Status: SHIPPED | OUTPATIENT
Start: 2023-11-30 | End: 2023-12-28

## 2023-11-30 RX ORDER — DIPHENHYDRAMINE HCL 25 MG
4 CAPSULE ORAL
Qty: 380 EACH | Refills: 3 | Status: SHIPPED | OUTPATIENT
Start: 2023-11-30 | End: 2023-12-28

## 2023-11-30 NOTE — PROGRESS NOTES
Individual Follow-Up Form    11/30/2023    Quit Date: 8/11/2023     Clinical Status of Patient: Outpatient    Continuing Medication: yes  Patches 14 mg; 4 mg gum    Other Medications:      Target Symptoms: Withdrawal and medication side effects. The following were  rated moderate (3) to severe (4) on TCRS:  Moderate (3): none  Severe (4): none    Comments: Telephone visit due to the patient. Patient reports she has remained since her 8/11/2023 quit date & that she is using her 14 mg patches & 4 mg gum. She reports she is no longer taking Wellbutrin; but that she continues to have night sweats. Discussed NRT. Patient states she is good where she is at this time as she is new to the 14 mg patches since last visit. Patient reports she enjoyed her first smoke free Thanksgiving & is looking forward to her first smoke free holiday season & New Year & not having to make a resolution to quit. Discussed high risk situations to include being around other smokers & unexpected events. Patient states she was around other smokers at [facility]; however they were outside smoking. Patient states she did not go outside to engage nor was she tempted to smoke. She said she is able to remember the times that she would have been outside to take a few puffs; but that this year, she stated inside to enjoy her meal & nap. Session ended to allow the patient to go to feed her client. Patient states her goal is to remain smoke free & to attend Judaism more often. Follow up set for 12/28/2023 at 5:00 pm.     Diagnosis: F17.200    Next Visit: 4 weeks

## 2023-12-28 ENCOUNTER — CLINICAL SUPPORT (OUTPATIENT)
Dept: SMOKING CESSATION | Facility: CLINIC | Age: 73
End: 2023-12-28
Payer: COMMERCIAL

## 2023-12-28 DIAGNOSIS — F17.200 NICOTINE DEPENDENCE: Primary | ICD-10-CM

## 2023-12-28 PROCEDURE — 99403 PREV MED CNSL INDIV APPRX 45: CPT | Mod: S$GLB,,, | Performed by: SPEECH-LANGUAGE PATHOLOGIST

## 2023-12-28 PROCEDURE — 99403 PR PREVENT COUNSEL,INDIV,45 MIN: ICD-10-PCS | Mod: S$GLB,,, | Performed by: SPEECH-LANGUAGE PATHOLOGIST

## 2023-12-28 RX ORDER — DIPHENHYDRAMINE HCL 25 MG
4 CAPSULE ORAL
Qty: 380 EACH | Refills: 3 | Status: SHIPPED | OUTPATIENT
Start: 2023-12-28 | End: 2024-01-29 | Stop reason: SDUPTHER

## 2023-12-28 RX ORDER — IBUPROFEN 200 MG
1 TABLET ORAL DAILY
Qty: 30 PATCH | Refills: 3 | Status: SHIPPED | OUTPATIENT
Start: 2023-12-28 | End: 2024-02-29 | Stop reason: DRUGHIGH

## 2023-12-28 RX ORDER — NICOTINE 7MG/24HR
1 PATCH, TRANSDERMAL 24 HOURS TRANSDERMAL DAILY
Qty: 30 PATCH | Refills: 0 | Status: SHIPPED | OUTPATIENT
Start: 2023-12-28 | End: 2024-01-29 | Stop reason: SDUPTHER

## 2023-12-28 NOTE — PROGRESS NOTES
Individual Follow-Up Form    12/28/2023    Quit Date: 8/11/2023       Clinical Status of Patient: Outpatient     Continuing Medication: yes  Patches 14 mg; 4 mg gum,     Other Medications: reordered 7 mg patches this date                 Target Symptoms: Withdrawal and medication side effects. The following were   rated moderate (3) to severe (4) on TCRS:  Moderate (3): none  Severe (4): none     Comments: Telephone visit due to the patient is at work with her client. Patient reports she has remained smoke free since her 8/11/2023 & uses the 14 mg patches stating the pharmacy wanted to charge her for the 7 mg patches & reports she is using the 4 mg gum. CTTS ordered from the mail order pharmacy. Patient reports she has been having to work double shifts with her client due to other people cancelling & it has added stress to her; but she has not turned to smoking to cope. She states she wouldn't have time to smoke even if she had access due to being so busy with her job. Patient reports she has been managing stress well & can see that in the past as a smoker she would have been smoking up to 2 packs to manage a stressful situation. She reports she has gained knowledge from the sessions to allow her to realize smoking wouldn't change her situation & would add to the stress. She reports she also uses peppermints to manage urges/craves. She reports she enjoyed her first smoke free Christmas & is looking forward to her first smoke free New Year. Session ended to allow patient to fix her client his supper. Patient's reports her goal is to remain smoke free. Follow up set for 1/29/2024 at 5:00 pm.     Diagnosis: F17.200    Next Visit: 5 weeks

## 2024-01-29 ENCOUNTER — CLINICAL SUPPORT (OUTPATIENT)
Dept: SMOKING CESSATION | Facility: CLINIC | Age: 74
End: 2024-01-29
Payer: COMMERCIAL

## 2024-01-29 DIAGNOSIS — F17.200 NICOTINE DEPENDENCE: Primary | ICD-10-CM

## 2024-01-29 PROCEDURE — 99402 PREV MED CNSL INDIV APPRX 30: CPT | Mod: S$GLB,,, | Performed by: SPEECH-LANGUAGE PATHOLOGIST

## 2024-01-29 PROCEDURE — 99999 PR PBB SHADOW E&M-EST. PATIENT-LVL II: CPT | Mod: PBBFAC,,, | Performed by: SPEECH-LANGUAGE PATHOLOGIST

## 2024-01-29 RX ORDER — DIPHENHYDRAMINE HCL 25 MG
4 CAPSULE ORAL
Qty: 380 EACH | Refills: 3 | Status: SHIPPED | OUTPATIENT
Start: 2024-01-29

## 2024-01-29 RX ORDER — NICOTINE 7MG/24HR
1 PATCH, TRANSDERMAL 24 HOURS TRANSDERMAL DAILY
Qty: 30 PATCH | Refills: 0 | Status: SHIPPED | OUTPATIENT
Start: 2024-01-29 | End: 2024-02-29 | Stop reason: SDUPTHER

## 2024-01-29 NOTE — PROGRESS NOTES
Individual Follow-Up Form    2024    Quit Date: 2023    Clinical Status of Patient: Outpatient    Continuing Medication: yes 7 mg patches & 4 mg gum    Other Medications:      Target Symptoms: Withdrawal and medication side effects. The following were  rated moderate (3) to severe (4) on TCRS:  Moderate (3): none  Severe (4): none    Comments: Telephone visit due to the patient's job. Patient reports she has remained smoke free since her 2023 quit date & is utilizing her NRT of 7 mg patches & 4 mg gum down from the 14 mg patches.  She denies any negative side effects or mood changes nor any negatives with reduction on nicotine. Patient reports she attended a  where other people were smoking; but she did not turn to smoking to cope. She reports she can see there was a time where she would have been smoking to cope; but due to the knowledge she's gained from the program, she didn't. Praised patient on her continued positive coping strategies & habits that she has been creating which has allowed her to remain smoke free. Session ended due to her client needed assistance in the bathroom. Patient's goal is to remain smoke free. Follow up set for 2024 at 5:00 pm.     Diagnosis: F17.200    Next Visit: 5 weeks

## 2024-02-29 ENCOUNTER — TELEPHONE (OUTPATIENT)
Dept: SMOKING CESSATION | Facility: CLINIC | Age: 74
End: 2024-02-29
Payer: MEDICARE

## 2024-02-29 ENCOUNTER — CLINICAL SUPPORT (OUTPATIENT)
Dept: SMOKING CESSATION | Facility: CLINIC | Age: 74
End: 2024-02-29
Payer: COMMERCIAL

## 2024-02-29 DIAGNOSIS — F17.200 NICOTINE DEPENDENCE: Primary | ICD-10-CM

## 2024-02-29 PROCEDURE — 99404 PREV MED CNSL INDIV APPRX 60: CPT | Mod: S$GLB,,, | Performed by: SPEECH-LANGUAGE PATHOLOGIST

## 2024-02-29 RX ORDER — NICOTINE 7MG/24HR
1 PATCH, TRANSDERMAL 24 HOURS TRANSDERMAL DAILY
Qty: 30 PATCH | Refills: 0 | Status: SHIPPED | OUTPATIENT
Start: 2024-02-29 | End: 2024-03-28 | Stop reason: SDUPTHER

## 2024-02-29 NOTE — PROGRESS NOTES
Individual Follow-Up Form    2/29/2024    Quit Date: 8/11/2023     Clinical Status of Patient: Outpatient    Length of Service: 60 minutes     Continuing Medication: yes 7 mg patches & 4 mg gum    Other Medications:      Target Symptoms: Withdrawal and medication side effects. 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 Patient reports she has remained smoke free since her 8/11/2023 & utilizes her 7 mg patches & 4 mg gum. She reports going for a week without wearing patches & did fine. Celebrated the patient on her continued dedication to living her smoke free life. Administered TCRS with patient reporting slight symptoms of desire/crave. Discussed high risk situations which include stress. Patient reports the house she lives in may be getting sold which will have them looking for another place to live. She reports she is trying to look for options. Commended the patient for not turning to smoking to cope with life & unforseen circumstances. Goal is to remain smoke free. Follow up set for 3/28/2024 at 5:00 pm.     Diagnosis: F17.200    Next Visit: 4 weeks

## 2024-03-13 ENCOUNTER — APPOINTMENT (OUTPATIENT)
Dept: RADIOLOGY | Facility: HOSPITAL | Age: 74
End: 2024-03-13
Attending: INTERNAL MEDICINE
Payer: MEDICARE

## 2024-03-13 DIAGNOSIS — Z78.0 POSTMENOPAUSAL STATUS (AGE-RELATED) (NATURAL): ICD-10-CM

## 2024-03-13 PROCEDURE — 77080 DXA BONE DENSITY AXIAL: CPT | Mod: TC

## 2024-03-13 PROCEDURE — 77080 DXA BONE DENSITY AXIAL: CPT | Mod: 26,,, | Performed by: RADIOLOGY

## 2024-03-28 ENCOUNTER — CLINICAL SUPPORT (OUTPATIENT)
Dept: SMOKING CESSATION | Facility: CLINIC | Age: 74
End: 2024-03-28
Payer: COMMERCIAL

## 2024-03-28 DIAGNOSIS — F17.200 NICOTINE DEPENDENCE: Primary | ICD-10-CM

## 2024-03-28 PROCEDURE — 99404 PREV MED CNSL INDIV APPRX 60: CPT | Mod: S$GLB,,, | Performed by: SPEECH-LANGUAGE PATHOLOGIST

## 2024-03-28 RX ORDER — MICONAZOLE NITRATE 2 %
2 CREAM (GRAM) TOPICAL
Qty: 300 EACH | Refills: 0 | Status: SHIPPED | OUTPATIENT
Start: 2024-03-28

## 2024-03-28 RX ORDER — NICOTINE 7MG/24HR
1 PATCH, TRANSDERMAL 24 HOURS TRANSDERMAL DAILY
Qty: 30 PATCH | Refills: 0 | Status: SHIPPED | OUTPATIENT
Start: 2024-03-28 | End: 2024-06-06 | Stop reason: SDUPTHER

## 2024-03-28 NOTE — PROGRESS NOTES
Individual Follow-Up Form    3/28/2024    Quit Date: 8/11/2023     Clinical Status of Patient: Outpatient     Length of Service: 60 minutes     Continuing Medication: yes 7 mg patches & 4 mg gum     Other Medications: ordered 2 mg gum this date.                   Target Symptoms: Withdrawal and medication side effects. 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 Patient reports she has remained smoke free since her 8/11/2023 quit date. Celebrated with the patient approaching her 8 month smoke free quit. She reports utilizing 7 mg patches & 4 mg gum. No side effects or mood changes reported. Administered TCRS with patient reports no symptoms.She reports using around 3 pieces of gum per day. Discussed her NRT. Patient is willing to go down to the 2 mg nicotine gum. Discussed stress management. Patient reports she is currently caring for her brother in her home due to his accident in addition to her patient & is looking for a new place to live due to her landlord is selling the home.  Praised the patient for not turning to smoking to cope. Patient is looking forward to a smoke free Easter season. Goal is to remain smoke free. Follow up set for 4/25/2024 at 5:00 pm.     Diagnosis: F17.200    Next Visit: 4 weeks

## 2024-04-11 ENCOUNTER — CLINICAL SUPPORT (OUTPATIENT)
Dept: SMOKING CESSATION | Facility: CLINIC | Age: 74
End: 2024-04-11
Payer: COMMERCIAL

## 2024-04-11 DIAGNOSIS — F17.200 NICOTINE DEPENDENCE: Primary | ICD-10-CM

## 2024-04-11 PROCEDURE — 99407 BEHAV CHNG SMOKING > 10 MIN: CPT | Mod: S$GLB,,, | Performed by: GENERAL PRACTICE

## 2024-04-11 PROCEDURE — 99999 PR PBB SHADOW E&M-EST. PATIENT-LVL I: CPT | Mod: PBBFAC,,,

## 2024-04-11 NOTE — PROGRESS NOTES
Spoke with patient today in regard to smoking cessation progress 12 month telephone follow up, she states that she is tobacco free.  Commended patient on the accomplishments thus far.  Informed patient of benefit period, future follow up, and contact information if any further help or support is needed.  Patient is currently in services.  Will complete smart form for 12 month follow up on Quit attempt #1.

## 2024-04-25 ENCOUNTER — CLINICAL SUPPORT (OUTPATIENT)
Dept: SMOKING CESSATION | Facility: CLINIC | Age: 74
End: 2024-04-25
Payer: COMMERCIAL

## 2024-04-25 ENCOUNTER — HOSPITAL ENCOUNTER (OUTPATIENT)
Dept: RADIOLOGY | Facility: HOSPITAL | Age: 74
Discharge: HOME | End: 2024-04-25
Attending: INTERNAL MEDICINE
Payer: MEDICARE

## 2024-04-25 DIAGNOSIS — Z12.31 ENCOUNTER FOR SCREENING MAMMOGRAM FOR MALIGNANT NEOPLASM OF BREAST: ICD-10-CM

## 2024-04-25 DIAGNOSIS — F17.200 NICOTINE DEPENDENCE: Primary | ICD-10-CM

## 2024-04-25 PROCEDURE — 99404 PREV MED CNSL INDIV APPRX 60: CPT | Mod: S$GLB,,, | Performed by: SPEECH-LANGUAGE PATHOLOGIST

## 2024-04-25 PROCEDURE — 77067 SCR MAMMO BI INCL CAD: CPT | Mod: TC

## 2024-04-25 PROCEDURE — 77063 BREAST TOMOSYNTHESIS BI: CPT | Mod: 26,,, | Performed by: RADIOLOGY

## 2024-04-25 PROCEDURE — 77067 SCR MAMMO BI INCL CAD: CPT | Mod: 26,,, | Performed by: RADIOLOGY

## 2024-04-25 NOTE — PROGRESS NOTES
Individual Follow-Up Form    4/25/2024    Quit Date: 8/11/2023     Clinical Status of Patient: Outpatient     Length of Service: 60 minutes     Continuing Medication: yes 7 mg patches & 2 mg gum     Other Medications:        Target Symptoms: Withdrawal and medication side effects. 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 Patient reports she has remained smoke free since her last session & her 8/11/2023 quit date. She reports she is utilizing 7 mg patches & 2 mg gum that she uses 3 pieces per day (in the am, after lunch & then while caregiving).   She denies any negative side effects or mood changes. Administered TCRS with patient reporting slight symptoms of increased appetite/hunger. Discussed patient's urges & crave levels with her 7 mg patches. She has days where she doesn't wear her patches with the longest being 2 days. Discussed a plan to work toward being patch free based on the patient's reports of having days of going without them & not noticing any changes in addition to her management of urges/craves. Patient is willing to make an attempt to not wear her patches to work toward her goal of being patch free. Discussed high risk situations of other smokers & stress. She reports her daughter does smoke; however the patient has not engaged in any smoking with others. She reports the home she lives in hasn't been sold; but she is still looking for another place. She states she's been managing stress well & isn't using smoking to cope. Patient is looking forward to her smoke free Mother's Day. She reports quitting smoking isn't something she thought she could accomplish & that she is proud of her accomplishment. Patient's goal is to remain smoke free & be patch free by next session. Follow up set for 6/6/2024 at 5:00 pm     Diagnosis: F17.200    Next Visit: 6 weeks

## 2024-05-08 ENCOUNTER — OFFICE VISIT (OUTPATIENT)
Dept: FAMILY MEDICINE | Facility: CLINIC | Age: 74
End: 2024-05-08
Payer: MEDICARE

## 2024-05-08 VITALS
HEART RATE: 88 BPM | OXYGEN SATURATION: 97 % | RESPIRATION RATE: 18 BRPM | TEMPERATURE: 97 F | BODY MASS INDEX: 42.07 KG/M2 | DIASTOLIC BLOOD PRESSURE: 82 MMHG | SYSTOLIC BLOOD PRESSURE: 130 MMHG | WEIGHT: 237.44 LBS | HEIGHT: 63 IN

## 2024-05-08 DIAGNOSIS — E78.00 HYPERCHOLESTEROLEMIA: ICD-10-CM

## 2024-05-08 DIAGNOSIS — R73.03 PREDIABETES: ICD-10-CM

## 2024-05-08 DIAGNOSIS — R60.9 EDEMA, UNSPECIFIED TYPE: ICD-10-CM

## 2024-05-08 DIAGNOSIS — J43.9 PULMONARY EMPHYSEMA, UNSPECIFIED EMPHYSEMA TYPE: ICD-10-CM

## 2024-05-08 DIAGNOSIS — K63.5 POLYP OF COLON, UNSPECIFIED PART OF COLON, UNSPECIFIED TYPE: ICD-10-CM

## 2024-05-08 DIAGNOSIS — I25.118 CORONARY ARTERY DISEASE OF NATIVE ARTERY OF NATIVE HEART WITH STABLE ANGINA PECTORIS: ICD-10-CM

## 2024-05-08 DIAGNOSIS — E66.01 MORBID OBESITY WITH BMI OF 40.0-44.9, ADULT: ICD-10-CM

## 2024-05-08 DIAGNOSIS — B00.9 HSV-2 (HERPES SIMPLEX VIRUS 2) INFECTION: ICD-10-CM

## 2024-05-08 DIAGNOSIS — Z00.00 ENCOUNTER FOR PREVENTIVE HEALTH EXAMINATION: Primary | ICD-10-CM

## 2024-05-08 DIAGNOSIS — I27.20 PULMONARY HYPERTENSION, UNSPECIFIED: ICD-10-CM

## 2024-05-08 DIAGNOSIS — F17.210 CIGARETTE NICOTINE DEPENDENCE WITHOUT COMPLICATION: ICD-10-CM

## 2024-05-08 DIAGNOSIS — I10 HYPERTENSION, UNSPECIFIED TYPE: ICD-10-CM

## 2024-05-08 DIAGNOSIS — R91.1 LUNG NODULE: ICD-10-CM

## 2024-05-08 PROBLEM — J44.9 CHRONIC OBSTRUCTIVE PULMONARY DISEASE, UNSPECIFIED: Status: ACTIVE | Noted: 2024-05-08

## 2024-05-08 PROCEDURE — 1160F RVW MEDS BY RX/DR IN RCRD: CPT | Mod: CPTII,S$GLB,, | Performed by: NURSE PRACTITIONER

## 2024-05-08 PROCEDURE — 1101F PT FALLS ASSESS-DOCD LE1/YR: CPT | Mod: CPTII,S$GLB,, | Performed by: NURSE PRACTITIONER

## 2024-05-08 PROCEDURE — 3288F FALL RISK ASSESSMENT DOCD: CPT | Mod: CPTII,S$GLB,, | Performed by: NURSE PRACTITIONER

## 2024-05-08 PROCEDURE — G0439 PPPS, SUBSEQ VISIT: HCPCS | Mod: S$GLB,,, | Performed by: NURSE PRACTITIONER

## 2024-05-08 PROCEDURE — 3079F DIAST BP 80-89 MM HG: CPT | Mod: CPTII,S$GLB,, | Performed by: NURSE PRACTITIONER

## 2024-05-08 PROCEDURE — 1159F MED LIST DOCD IN RCRD: CPT | Mod: CPTII,S$GLB,, | Performed by: NURSE PRACTITIONER

## 2024-05-08 PROCEDURE — 1170F FXNL STATUS ASSESSED: CPT | Mod: CPTII,S$GLB,, | Performed by: NURSE PRACTITIONER

## 2024-05-08 PROCEDURE — 99999 PR PBB SHADOW E&M-EST. PATIENT-LVL V: CPT | Mod: PBBFAC,,, | Performed by: NURSE PRACTITIONER

## 2024-05-08 PROCEDURE — 3075F SYST BP GE 130 - 139MM HG: CPT | Mod: CPTII,S$GLB,, | Performed by: NURSE PRACTITIONER

## 2024-05-08 PROCEDURE — 4010F ACE/ARB THERAPY RXD/TAKEN: CPT | Mod: CPTII,S$GLB,, | Performed by: NURSE PRACTITIONER

## 2024-05-08 RX ORDER — LOSARTAN POTASSIUM 50 MG/1
50 TABLET ORAL EVERY MORNING
COMMUNITY
Start: 2024-03-07

## 2024-05-08 RX ORDER — HYDROCHLOROTHIAZIDE 12.5 MG/1
TABLET ORAL
COMMUNITY
Start: 2024-03-07

## 2024-05-08 NOTE — PATIENT INSTRUCTIONS
Counseling and Referral of Other Preventative  (Italic type indicates deductible and co-insurance are waived)    Patient Name: Triny Mandel  Today's Date: 5/8/2024    Health Maintenance       Date Due Completion Date    TETANUS VACCINE Never done ---    Aspirin/Antiplatelet Therapy Never done ---    High Dose Statin Never done ---    Shingles Vaccine (1 of 2) Never done ---    RSV Vaccine (Age 60+ and Pregnant patients) (1 - 1-dose 60+ series) Never done ---    Colorectal Cancer Screening 06/22/2023 6/22/2022    Override on 7/30/2020: Done (due 1 year due to suboptimal bowel cleanse)    COVID-19 Vaccine (4 - 2023-24 season) 09/01/2023 11/30/2021    Hemoglobin A1c (Prediabetes) 01/23/2024 1/23/2023    LDCT Lung Screen 02/01/2024 2/1/2023    Influenza Vaccine (Season Ended) 09/01/2024 ---    Mammogram 04/25/2025 4/25/2024    DEXA Scan 03/13/2027 3/13/2024    Lipid Panel 01/23/2028 1/23/2023        No orders of the defined types were placed in this encounter.    The following information is provided to all patients.  This information is to help you find resources for any of the problems found today that may be affecting your health:                  Living healthy guide: www.[facility].[place].gov      Understanding Diabetes: www.diabetes.org      Eating healthy: www.cdc.gov/healthyweight      CDC home safety checklist: www.cdc.gov/steadi/patient.html      Agency on Aging: www.goea.[place].[facility]      Alcoholics anonymous (AA): www.aa.org      Physical Activity: www.mac.nih.gov/jt0xxex      Tobacco use: www.quitwithusla.org

## 2024-05-08 NOTE — PROGRESS NOTES
"  Triny Mandel presented for a follow-up Medicare AWV today. The following components were reviewed and updated:    Medical history  Family History  Social history  Allergies and Current Medications  Health Risk Assessment  Health Maintenance  Care Team    **See Completed Assessments for Annual Wellness visit with in the encounter summary    The following assessments were completed:  Depression Screening  Cognitive function Screening  Timed Get Up Test  Whisper Test      Opioid documentation:      Patient does not have a current opioid prescription.          Vitals:    05/08/24 1124   BP: 130/82   Pulse: 88   Resp: 18   Temp: 97.4 °F (36.3 °C)   SpO2: 97%   Weight: 107.7 kg (237 lb 7 oz)   Height: 5' 3" (1.6 m)     Body mass index is 42.06 kg/m².       Physical Exam  Constitutional:       Appearance: Normal appearance. She is obese.   HENT:      Head: Normocephalic.   Cardiovascular:      Rate and Rhythm: Normal rate and regular rhythm.   Musculoskeletal:         General: Normal range of motion.   Skin:     General: Skin is warm.   Neurological:      General: No focal deficit present.      Mental Status: She is alert and oriented to person, place, and time.   Psychiatric:         Mood and Affect: Mood normal.         Behavior: Behavior normal.         Thought Content: Thought content normal.         Judgment: Judgment normal.         Current Outpatient Medications:     hydroCHLOROthiazide (HYDRODIURIL) 12.5 MG Tab, , Disp: , Rfl:     hydroCHLOROthiazide (HYDRODIURIL) 25 MG tablet, TAKE 1 TABLET(25 MG) BY MOUTH DAILY AS NEEDED FOR SWELLING, Disp: 90 tablet, Rfl: 2    losartan (COZAAR) 50 MG tablet, Take 50 mg by mouth every morning., Disp: , Rfl:     aspirin (ECOTRIN) 81 MG EC tablet, Take 1 tablet (81 mg total) by mouth once daily., Disp: 30 tablet, Rfl: 0    atorvastatin (LIPITOR) 40 MG tablet, Take 1 tablet (40 mg total) by mouth once daily., Disp: 90 tablet, Rfl: 3    buPROPion (WELLBUTRIN SR) 150 MG TBSR 12 " hr tablet, Take 1 tablet twice daily, Disp: 60 tablet, Rfl: 0    nicotine (NICODERM CQ) 7 mg/24 hr, Place 1 patch onto the skin once daily., Disp: 30 patch, Rfl: 0    nicotine polacrilex (NICORETTE) 4 MG Gum, Take 1 each (4 mg total) by mouth as needed (Use in place of a cigarette not to exceed 12 pieces per day. Use chew & park technique. Avoid food/drink for 15 min before & after)., Disp: 380 each, Rfl: 3    nicotine, polacrilex, (NICORETTE) 2 mg Gum, Take 1 each (2 mg total) by mouth as needed (Use as needed for crave)., Disp: 300 each, Rfl: 0    valACYclovir (VALTREX) 1000 MG tablet, Take 1 tablet (1,000 mg total) by mouth 2 (two) times daily. for 10 days, Disp: 20 tablet, Rfl: 0      Diagnoses and health risks identified today and associated recommendations/orders:  1. Encounter for preventive health examination    PT STATES SHE NOW SEEING Dr. DELFINO VEGA AT [facility]  2. Morbid obesity with BMI of 40.0-44.9, adult  Chronic and Ongoing  Discussed and recommend  low fat/carb/chol diet. Cardio exercise as tolerated. Life style modifications.        3. Pulmonary hypertension, unspecified  Chronic and Ongoing.  Pt now following new PCP at [facility]-  last visit  2 months ago Dr. Delfino Vega    Continue current treatment plan as previously prescribed with your PCP      4. Hypertension, unspecified type  ,Chronic and Stable. Continue current treatment plan as previously prescribed with your PCP      5. Coronary artery disease of native artery of native heart with stable angina pectoris  Chronic and Ongoing.  Pt now following new PCP at [facility]-  last visit  2 months ago Dr. Delfino Vega    6. Hypercholesterolemia  Chronic and Ongoing.  Pt now following new PCP at [facility]-  last visit  2 months ago Dr. Delfino Vega    7. Prediabetes  Chronic and Ongoing.  Pt now following new PCP at [facility]-  last visit  2 months ago Dr. Delfino Vega    8.  Cigarette nicotine dependence without complication  Chronic and Ongoing on Nicotine gum/patch  Followed by cessation program Continue current treatment plan as previously prescribed with your PCP    9. Lung nodule  Chronic and Ongoing.  Pt now following new PCP at [facility]-  last visit  2 months ago Dr. Victorino Apple    10. Pulmonary emphysema, unspecified emphysema type  Chronic and Ongoing.  Pt now following new PCP at [facility]-  last visit  2 months ago Dr. Victorino Apple    11. Edema, unspecified type  Chronic and Stable on HCT Continue current treatment plan as previously prescribed with your PCP h    12. Polyp of colon, unspecified part of colon, unspecified type  Chronic and Ongoing.  Pt now following new PCP at [facility]-  last visit  2 months ago Dr. Victorino Apple    13. HSV-2 (herpes simplex virus 2) infection  Chronic and Stable.  States no flare up Continue current treatment plan as previously prescribed with your PCP    Provided Triny with a 5-10 year written screening schedule and personal prevention plan. Recommendations were developed using the USPSTF age appropriate recommendations. Education, counseling, and referrals were provided as needed.  After Visit Summary printed and given to patient which includes a list of additional screenings\tests needed.    Follow up in about 1 year (around 5/8/2025).

## 2024-06-06 ENCOUNTER — CLINICAL SUPPORT (OUTPATIENT)
Dept: SMOKING CESSATION | Facility: CLINIC | Age: 74
End: 2024-06-06
Payer: COMMERCIAL

## 2024-06-06 DIAGNOSIS — F17.200 NICOTINE DEPENDENCE: Primary | ICD-10-CM

## 2024-06-06 PROCEDURE — 99403 PREV MED CNSL INDIV APPRX 45: CPT | Mod: S$GLB,,, | Performed by: SPEECH-LANGUAGE PATHOLOGIST

## 2024-06-06 RX ORDER — NICOTINE 7MG/24HR
1 PATCH, TRANSDERMAL 24 HOURS TRANSDERMAL DAILY
Qty: 30 PATCH | Refills: 0 | Status: SHIPPED | OUTPATIENT
Start: 2024-06-06

## 2024-06-06 NOTE — PROGRESS NOTES
Individual Follow-Up Form    6/6/2024    Quit Date: 8/11/2023     Clinical Status of Patient: Outpatient     Length of Service: 45 minutes     Continuing Medication: yes 7 mg patches & 2 mg gum (recently ran out of patches)     Other Medications:                   Target Symptoms: Withdrawal and medication side effects. The following were   rated moderate (3) to severe (4) on TCRS:  Moderate (3): none  Severe (4): none     Comments: DELAY WITH TIME STAMP DUE TO PATIENT DIDN'T HAVE PCP LISTED & HAD TO REQUEST ASSISTANCE FROM [facility] TO GET THE PATIENT CHECKED IN. 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 Patient reports she has remained smoke free since her last session & her 8/11/2023 quit date. She reports she ran out of patches 1 week ago & has only been using the toothpicks & sometimes the gum. Discussed with the patient how she has felt being nicotine free which she reports is the first time in her life.  She reports she would like [facility] to re-order the patches for her to have just in case. Discussed with the patient the role of her cognitive & behavioral changes that she has made with her quit that have become the heller factors in helping the patient achieve & remain quit. Patient reports she went to her niece's house for Memorial Day & was around people who smoke & that it didn't bother her. Commended the patient on her continued commitment to her smoke free life. Session ended to allow the patient to prepare supper for her patient. Patient's goal is to remain smoke free. Follow up set for 7/18/2024 at 5:00 pm.       Diagnosis: F17.200    Next Visit: 6 weeks

## 2024-07-11 ENCOUNTER — CLINICAL SUPPORT (OUTPATIENT)
Dept: SMOKING CESSATION | Facility: CLINIC | Age: 74
End: 2024-07-11
Payer: COMMERCIAL

## 2024-07-11 ENCOUNTER — TELEPHONE (OUTPATIENT)
Dept: SMOKING CESSATION | Facility: CLINIC | Age: 74
End: 2024-07-11
Payer: MEDICARE

## 2024-07-11 DIAGNOSIS — F17.200 NICOTINE DEPENDENCE: Primary | ICD-10-CM

## 2024-07-11 PROCEDURE — 99404 PREV MED CNSL INDIV APPRX 60: CPT | Mod: S$GLB,,, | Performed by: SPEECH-LANGUAGE PATHOLOGIST

## 2024-07-11 NOTE — PROGRESS NOTES
Individual Follow-Up Form    7/11/2024    Quit Date: 8/11/2023     Clinical Status of Patient: Outpatient     Length of Service:      Continuing Medication: yes 2 mg gum      Other Medications:                   Target Symptoms: Withdrawal and medication side effects. The following were   rated moderate (3) to severe (4) on TCRS:  Moderate (3): none  Severe (4): none     Comments: LATE TIME STAMP DUE TO PATIENT HAD WORK WITH HER CLIENT & SESSION HELD AFTER HER SCHEDULED 5:00 PM APPOINTMENT TIME AT HER REQUEST. She reports she has remained smoke free since last session & her 8/11/2023 quit date. She reports she is no longer using the 7 mg patches & is using the 2 mg gum; but reports she is transitioning off of that. She reports she had a death in the family & the services are Saturday. Active listening & emotional support provided. Discussed her healthy coping strategies that she has utilized to navigate the news. She reports she is dealing with it; but she is proud that she didn't turn to smoking to cope. She acknowledges that in the past, this situation would have resulted in her relapsing to use tobacco to cope with the news. She credits the information & knowledge that she has gained from her sessions & the support she receives as factors that has allowed her to maintain her smoke free life. Commended the patient on her continued efforts & for applying the healthy coping strategies to navigate life experiences. Patient's goal is to remain smoke free & be nicotine free. Follow up set for 8/22/2024 at 5:00 pm.       Diagnosis: F17.200    Next Visit: 5 weeks

## 2024-07-11 NOTE — TELEPHONE ENCOUNTER
Call to patient for scheduled 5 pm appointment. 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 [facility] will call the patient back at her request

## 2024-08-22 ENCOUNTER — CLINICAL SUPPORT (OUTPATIENT)
Dept: SMOKING CESSATION | Facility: CLINIC | Age: 74
End: 2024-08-22
Payer: COMMERCIAL

## 2024-08-22 DIAGNOSIS — F17.200 NICOTINE DEPENDENCE: Primary | ICD-10-CM

## 2024-08-22 PROCEDURE — 99404 PREV MED CNSL INDIV APPRX 60: CPT | Mod: S$GLB,,, | Performed by: SPEECH-LANGUAGE PATHOLOGIST

## 2024-08-22 NOTE — PROGRESS NOTES
Individual Follow-Up Form    2024    Quit Date: 2023     Clinical Status of Patient: Outpatient     Length of Service: 60 minutes     Continuing Medication: no     Other Medications:                   Target Symptoms: Withdrawal and medication side effects. 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 Patient reports she has remained smoke free since last session & her 2023 quit date. She also reports she is not using the nicotine gum now.  Celebrated & congratulated the patient on her smoke free year anniversary. Patient reports she has been dealing with some things including work stress & some deaths. She reports she is a new great grandmother & was able to care for the new baby for 1 week. She reports the  did have her emotional; but she did not turn to smoking to cope. Commended the patient on her decision making to deter her from turning to smoking for comfort. Patient reports she is managing work stress situations in the best way that she can & that she did take 2 days off for the first time while working. Patient states she is enjoying her quit & that she is nicotine free for the first time in her life. 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 Goal is to remain smoke & nicotine free. Follow up set for 2024 at 5:00 pm       Diagnosis: F17.200    Next Visit: 5 weeks

## 2024-09-26 ENCOUNTER — CLINICAL SUPPORT (OUTPATIENT)
Dept: SMOKING CESSATION | Facility: CLINIC | Age: 74
End: 2024-09-26
Payer: COMMERCIAL

## 2024-09-26 ENCOUNTER — TELEPHONE (OUTPATIENT)
Dept: SMOKING CESSATION | Facility: CLINIC | Age: 74
End: 2024-09-26
Payer: MEDICARE

## 2024-09-26 DIAGNOSIS — F17.200 NICOTINE DEPENDENCE: Primary | ICD-10-CM

## 2024-09-26 PROCEDURE — 99402 PREV MED CNSL INDIV APPRX 30: CPT | Mod: S$GLB,,, | Performed by: SPEECH-LANGUAGE PATHOLOGIST

## 2024-09-26 NOTE — PROGRESS NOTES
Problem: Pain  Goal: Verbalizes/displays adequate comfort level or baseline comfort level  6/23/2024 1727 by Vero Coy RN  Outcome: Progressing     Problem: ABCDS Injury Assessment  Goal: Absence of physical injury  6/23/2024 1727 by Vero Coy RN  Outcome: Progressing     Problem: Safety - Adult  Goal: Free from fall injury  6/23/2024 1727 by Vero Coy RN  Outcome: Progressing      Individual Follow-Up Form    2024    Quit Date: 2023     Clinical Status of Patient: Outpatient    Length of Service: 30 minutes    Continuing Medication: no    Other Medications:      Target Symptoms: Withdrawal and medication side effects. 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 Patient reports she has remained smoke free since her last session & her 2024 quit date. She is not using any NRT. Patient reports things have been going well since last session. She reports she has been busy caring for her  great grand daughter, her client & her injured brother & that she has been managing the stressors that comes along with life tasks. She states she hasn't thought of a cigarette or smoking. Discussed with the patient her benefits ending 10/4/2024 & graduating from the program since the patient has remained smoke free without any slips & is not utilizing NRT. Patient states she feels that she is ready to graduate since she is doing well & she achieved her smoke free goal. Patient reports she is proud of herself & her accomplishment & the information she has been provided & the way it was broken down by [facility] during the appointments that allowed her to achieve her quit easily. No additional visits at this time due to the patient is smoke free & not on any NRT at this time. Informed patient to call [facility] at any time.         Diagnosis: F17.200    Next Visit: NO ADDITIONAL VISITS SCHEDULED DUE TO THE PATIENT HAS REMAINED QUIT & IS NOT ON ANY NRT AT THIS TIME. PATIENT GRADUATED FROM THE PROGRAM

## 2025-04-28 ENCOUNTER — HOSPITAL ENCOUNTER (OUTPATIENT)
Dept: RADIOLOGY | Facility: HOSPITAL | Age: 75
Discharge: HOME | End: 2025-04-28
Payer: MEDICARE

## 2025-04-28 DIAGNOSIS — Z12.31 SCREENING MAMMOGRAM, ENCOUNTER FOR: ICD-10-CM

## 2025-04-28 PROCEDURE — 77067 SCR MAMMO BI INCL CAD: CPT | Mod: 26,,, | Performed by: STUDENT IN AN ORGANIZED HEALTH CARE EDUCATION/TRAINING PROGRAM

## 2025-04-28 PROCEDURE — 77063 BREAST TOMOSYNTHESIS BI: CPT | Mod: 26,,, | Performed by: STUDENT IN AN ORGANIZED HEALTH CARE EDUCATION/TRAINING PROGRAM

## 2025-04-28 PROCEDURE — 77067 SCR MAMMO BI INCL CAD: CPT | Mod: TC

## 2025-05-08 ENCOUNTER — OFFICE VISIT (OUTPATIENT)
Dept: FAMILY MEDICINE | Facility: CLINIC | Age: 75
End: 2025-05-08
Payer: MEDICARE

## 2025-05-08 VITALS
HEART RATE: 94 BPM | HEIGHT: 63 IN | OXYGEN SATURATION: 98 % | RESPIRATION RATE: 20 BRPM | BODY MASS INDEX: 42.46 KG/M2 | SYSTOLIC BLOOD PRESSURE: 130 MMHG | DIASTOLIC BLOOD PRESSURE: 80 MMHG | WEIGHT: 239.63 LBS | TEMPERATURE: 97 F

## 2025-05-08 DIAGNOSIS — R73.03 PREDIABETES: ICD-10-CM

## 2025-05-08 DIAGNOSIS — I27.20 PULMONARY HYPERTENSION, UNSPECIFIED: ICD-10-CM

## 2025-05-08 DIAGNOSIS — F17.210 CIGARETTE NICOTINE DEPENDENCE WITHOUT COMPLICATION: ICD-10-CM

## 2025-05-08 DIAGNOSIS — J44.9 CHRONIC OBSTRUCTIVE PULMONARY DISEASE, UNSPECIFIED COPD TYPE: ICD-10-CM

## 2025-05-08 DIAGNOSIS — I10 HYPERTENSION, UNSPECIFIED TYPE: ICD-10-CM

## 2025-05-08 DIAGNOSIS — Z00.00 ENCOUNTER FOR MEDICARE ANNUAL WELLNESS EXAM: Primary | ICD-10-CM

## 2025-05-08 DIAGNOSIS — E78.00 HYPERCHOLESTEROLEMIA: ICD-10-CM

## 2025-05-08 DIAGNOSIS — E66.01 MORBID OBESITY WITH BMI OF 40.0-44.9, ADULT: ICD-10-CM

## 2025-05-08 DIAGNOSIS — K63.5 POLYP OF COLON, UNSPECIFIED PART OF COLON, UNSPECIFIED TYPE: ICD-10-CM

## 2025-05-08 PROCEDURE — 99999 PR PBB SHADOW E&M-EST. PATIENT-LVL III: CPT | Mod: PBBFAC,,, | Performed by: NURSE PRACTITIONER

## 2025-05-08 RX ORDER — ERGOCALCIFEROL 1.25 MG/1
50000 CAPSULE ORAL
COMMUNITY
Start: 2025-02-13

## 2025-05-08 RX ORDER — POTASSIUM CHLORIDE 1500 MG/1
20 TABLET, EXTENDED RELEASE ORAL EVERY MORNING
COMMUNITY
Start: 2025-02-28

## 2025-05-08 RX ORDER — LOSARTAN POTASSIUM AND HYDROCHLOROTHIAZIDE 12.5; 5 MG/1; MG/1
TABLET ORAL DAILY
COMMUNITY
Start: 2025-05-06

## 2025-05-08 NOTE — PROGRESS NOTES
"  Triny Mandel presented for a follow-up Medicare AWV today. The following components were reviewed and updated:    Medical history  Family History  Social history  Allergies and Current Medications  Health Risk Assessment  Health Maintenance  Care Team    **See Completed Assessments for Annual Wellness visit with in the encounter summary    The following assessments were completed:  Depression Screening  Cognitive function Screening  Timed Get Up Test  Whisper Test      Opioid documentation:      Patient does not have a current opioid prescription.          Vitals:    05/08/25 1113   BP: 130/80   Pulse: 94   Resp: 20   Temp: 97 °F (36.1 °C)   SpO2: 98%   Weight: 108.7 kg (239 lb 10.2 oz)   Height: 5' 3" (1.6 m)     Body mass index is 42.45 kg/m².       Physical Exam  Vitals and nursing note reviewed.   Constitutional:       Appearance: Normal appearance. She is obese.   HENT:      Head: Normocephalic.   Eyes:      Pupils: Pupils are equal, round, and reactive to light.   Cardiovascular:      Rate and Rhythm: Normal rate and regular rhythm.      Pulses: Normal pulses.      Heart sounds: Normal heart sounds.   Pulmonary:      Effort: Pulmonary effort is normal.      Breath sounds: Normal breath sounds.   Musculoskeletal:         General: Normal range of motion.   Skin:     General: Skin is warm.   Neurological:      Mental Status: She is alert and oriented to person, place, and time.   Psychiatric:         Mood and Affect: Mood normal.         Behavior: Behavior normal.         Thought Content: Thought content normal.         Judgment: Judgment normal.       Current Outpatient Medications   Medication Instructions    aspirin (ECOTRIN) 81 mg, Oral, Daily    atorvastatin (LIPITOR) 40 mg, Oral, Daily    ergocalciferol (ERGOCALCIFEROL) 50,000 Units, Every 7 days    losartan-hydrochlorothiazide 50-12.5 mg (HYZAAR) 50-12.5 mg per tablet Daily    potassium chloride (K-TAB) 20 mEq 20 mEq, Every morning    valACYclovir " (VALTREX) 1,000 mg, Oral, 2 times daily         Diagnoses and health risks identified today and associated recommendations/orders:  1. Encounter for Medicare annual wellness exam      2. Morbid obesity with BMI of 40.0-44.9, adult  BMI 42KG  Discussed and recommend  low fat/carb/chol diet. Cardio exercise as tolerated. Life style modifications.  Chronic and Stable. Continue current treatment plan as previously prescribed with your-  PCP @[facility]    3. Prediabetes  Discussed and recommend  low fat/carb/chol diet. Cardio exercise as tolerated. Life style modifications.  Chronic and Stable. Continue current treatment plan as previously prescribed with your-  PCP @[facility]    4. Hypertension, unspecified type  Chronic and Stable.  See meds Continue current treatment plan as previously prescribed with your-  PCP [facility]    5. Pulmonary hypertension, unspecified  Chronic and Stable.  See meds Continue current treatment plan as previously prescribed with your  PCP [facility]    6. Chronic obstructive pulmonary disease, unspecified COPD type  Chronic and Stable.   Asymptomatic  Continue current treatment plan as previously prescribed with your-  PCP [facility]    7. Polyp of colon, unspecified part of colon, unspecified type  Pt decline further colonscopy    8. Cigarette nicotine dependence without complication  Pt states she no longer smokes    9. Hypercholesterolemia  Chronic and Stable.  See meds Continue current treatment plan as previously prescribed with your  PCP [facility]  I offered to discuss advanced care planning, including how to pick a person who would make decisions for you if you were unable to make them for yourself, called a health care power of , and what kind of decisions you might make such as use of life sustaining treatments such as ventilators and tube feeding when faced with a life  limiting illness recorded on a living will that they will need to know. (How you want to be cared for as you near the end of your natural life)     X Patient is interested in learning more about how to make advanced directives.  I provided them paperwork and offered to discuss this with them.      Provided Triny with a 5-10 year written screening schedule and personal prevention plan. Recommendations were developed using the USPSTF age appropriate recommendations. Education, counseling, and referrals were provided as needed.  After Visit Summary printed and given to patient which includes a list of additional screenings\tests needed.    Follow up in about 1 year (around 5/8/2026).  Racquel Lazo NP

## 2025-05-08 NOTE — PATIENT INSTRUCTIONS
Counseling and Referral of Other Preventative  (Italic type indicates deductible and co-insurance are waived)    Patient Name: Triny Mandel  Today's Date: 5/8/2025    Health Maintenance       Date Due Completion Date    TETANUS VACCINE Never done ---    High Dose Statin Never done ---    Shingles Vaccine (1 of 2) Never done ---    RSV Vaccine (Age 60+ and Pregnant patients) (1 - Risk 60-74 years 1-dose series) Never done ---    Colorectal Cancer Screening 06/22/2023 6/22/2022    Override on 7/30/2020: Done (due 1 year due to suboptimal bowel cleanse)    Hemoglobin A1c (Prediabetes) 01/23/2024 1/23/2023    COVID-19 Vaccine (4 - 2024-25 season) 09/01/2024 11/30/2021    LDCT Lung Screen 07/16/2025 7/16/2024    Influenza Vaccine (Season Ended) 09/01/2025 ---    Mammogram 04/28/2026 4/28/2025    Lipid Panel 01/23/2028 1/23/2023    DEXA Scan 04/28/2028 4/28/2025        No orders of the defined types were placed in this encounter.    The following information is provided to all patients.  This information is to help you find resources for any of the problems found today that may be affecting your health:                  Living healthy guide: www.[facility].gov      Understanding Diabetes: www.diabetes.org      Eating healthy: www.cdc.gov/healthyweight      CDC home safety checklist: www.cdc.gov/steadi/patient.html      Agency on Aging: www.goea.[place].gov      Alcoholics anonymous (AA): www.aa.org      Physical Activity: www.mac.nih.gov/oz7tdnx      Tobacco use: www.quitwithusla.org

## 2025-05-23 ENCOUNTER — HOSPITAL ENCOUNTER (OUTPATIENT)
Dept: RADIOLOGY | Facility: HOSPITAL | Age: 75
Discharge: HOME | End: 2025-05-23
Payer: MEDICARE

## 2025-05-23 DIAGNOSIS — R92.8 ABNORMAL MAMMOGRAM: ICD-10-CM

## 2025-05-23 PROCEDURE — 77065 DX MAMMO INCL CAD UNI: CPT | Mod: 26,RT,, | Performed by: RADIOLOGY

## 2025-05-23 PROCEDURE — 77065 DX MAMMO INCL CAD UNI: CPT | Mod: TC,RT

## 2025-05-23 PROCEDURE — 76642 ULTRASOUND BREAST LIMITED: CPT | Mod: 26,RT,, | Performed by: RADIOLOGY

## 2025-05-23 PROCEDURE — 77061 BREAST TOMOSYNTHESIS UNI: CPT | Mod: 26,RT,, | Performed by: RADIOLOGY

## 2025-05-23 PROCEDURE — 76642 ULTRASOUND BREAST LIMITED: CPT | Mod: TC,RT
